# Patient Record
Sex: FEMALE | Race: BLACK OR AFRICAN AMERICAN | Employment: FULL TIME | URBAN - METROPOLITAN AREA
[De-identification: names, ages, dates, MRNs, and addresses within clinical notes are randomized per-mention and may not be internally consistent; named-entity substitution may affect disease eponyms.]

---

## 2017-07-15 ENCOUNTER — HOSPITAL ENCOUNTER (EMERGENCY)
Age: 52
Discharge: HOME | End: 2017-07-15
Attending: EMERGENCY MEDICINE
Payer: SELF-PAY

## 2017-07-15 ENCOUNTER — APPOINTMENT (OUTPATIENT)
Dept: GENERAL RADIOLOGY | Age: 52
End: 2017-07-15
Attending: EMERGENCY MEDICINE
Payer: SELF-PAY

## 2017-07-15 VITALS
OXYGEN SATURATION: 100 % | HEART RATE: 84 BPM | SYSTOLIC BLOOD PRESSURE: 121 MMHG | RESPIRATION RATE: 18 BRPM | WEIGHT: 260 LBS | HEIGHT: 60 IN | TEMPERATURE: 98.6 F | DIASTOLIC BLOOD PRESSURE: 81 MMHG | BODY MASS INDEX: 51.04 KG/M2

## 2017-07-15 DIAGNOSIS — S60.041A CONTUSION OF RIGHT RING FINGER WITHOUT DAMAGE TO NAIL, INITIAL ENCOUNTER: Primary | ICD-10-CM

## 2017-07-15 PROCEDURE — 73140 X-RAY EXAM OF FINGER(S): CPT

## 2017-07-15 PROCEDURE — 77030008323 HC SPLNT FNGR GTR DJOR -A

## 2017-07-15 PROCEDURE — 99282 EMERGENCY DEPT VISIT SF MDM: CPT

## 2017-07-15 RX ORDER — IBUPROFEN 800 MG/1
800 TABLET ORAL
Qty: 20 TAB | Refills: 0 | Status: SHIPPED | OUTPATIENT
Start: 2017-07-15 | End: 2017-07-22

## 2017-07-15 NOTE — ED TRIAGE NOTES
Patient states jamming her right 4th finger while attempting to get into her car quickly to avoid the rain. Bruising noted to distal aspect of affected finger.

## 2017-07-15 NOTE — ED NOTES
Pt states ready for discharge. Pt states she will follow up with PCP as instructed by provider. Pt appears in NOAD. I have reviewed discharge instructions with the patient. Prescriptions were reviewed with patient instructed not to drink alcohol, drive a car, or operate heavy machinery while taking this medicine. The patient verbalized understanding. Patient seen leaving ED ambulatory without difficulty or need for assistance, with S/O in no sign of distress. Patient armband removed and shredded    Current Discharge Medication List      START taking these medications    Details   ibuprofen (MOTRIN) 800 mg tablet Take 1 Tab by mouth every six (6) hours as needed for Pain for up to 7 days. Qty: 20 Tab, Refills: 0         CONTINUE these medications which have NOT CHANGED    Details   amLODIPine (NORVASC) 5 mg tablet Take 5 mg by mouth daily. losartan (COZAAR) 50 mg tablet Take  by mouth daily. venlafaxine-SR (EFFEXOR XR) 75 mg capsule Take  by mouth daily. aspirin 81 mg chewable tablet Take 81 mg by mouth daily.

## 2017-07-15 NOTE — ED PROVIDER NOTES
HPI Comments: Rogerio Otto is a 54 y.o. female to ED c/o right ring finger pain, mainly over distal and proximal phalanges, after she jammed her finger in a car door yesterday. Pt has bruising but no wounds. No other injuries    Patient is a 54 y.o. female presenting with finger swelling. The history is provided by the patient. Finger Swelling    Pertinent negatives include no numbness. Past Medical History:   Diagnosis Date    Depression     Hypertension        Past Surgical History:   Procedure Laterality Date    HX  SECTION           Family History:   Problem Relation Age of Onset    Hypertension Other     Cancer Neg Hx     Diabetes Neg Hx     Heart Disease Neg Hx     Stroke Neg Hx        Social History     Social History    Marital status:      Spouse name: N/A    Number of children: N/A    Years of education: N/A     Occupational History    Not on file. Social History Main Topics    Smoking status: Current Every Day Smoker     Packs/day: 0.25    Smokeless tobacco: Not on file    Alcohol use 1.0 oz/week     2 Cans of beer per week    Drug use: No    Sexual activity: Not on file     Other Topics Concern    Not on file     Social History Narrative         ALLERGIES: Review of patient's allergies indicates no known allergies. Review of Systems   Constitutional: Negative. Musculoskeletal: Positive for arthralgias and joint swelling. Right ring finger per HPI   Skin:        +bruise 4th right finger   Neurological: Negative for weakness and numbness. All other systems reviewed and are negative. Vitals:    07/15/17 1457   BP: 121/81   Pulse: 84   Resp: 18   Temp: 98.6 °F (37 °C)   SpO2: 100%   Weight: 117.9 kg (260 lb)   Height: 5' (1.524 m)            Physical Exam   Constitutional: She appears well-developed and well-nourished. No distress. Neck: Normal range of motion. Cardiovascular: Intact distal pulses.     Pulmonary/Chest: Effort normal. Musculoskeletal: She exhibits tenderness. She exhibits no edema or deformity. Rt 4th finger with bruising and TTP along middle and distal phalanges, MCP, PIP and DIP with FROM wo crepitus but DIP with pain, nail intact, no wounds, no deformity, no asymmetry on comparison   Neurological: She is alert. She has normal reflexes. Nursing note and vitals reviewed. MDM  Number of Diagnoses or Management Options  Contusion of right ring finger without damage to nail, initial encounter:   Diagnosis management comments: No evidence of injury beyond contusion  Will xplint for shock protection, no furhter cantor needed, rx Motrin for pain, no f/u needed      Splint Check Note    Patient: Rogerio Otto  MRN: [id number]  Date: 7/15/2017  Age:  54 y.o.,      Sex: female    YOB: 1965      Type of Splint: Alumafoam   Location: right ring finger    Applied by Praful Ford RN neurovascular intact prior to splint placement neurovascular intact after splint placement splint is placed in good position. FADUMO Velásquez July 15, 2017 3:26 PM        Amount and/or Complexity of Data Reviewed  Tests in the radiology section of CPT®: ordered and reviewed (Neg right 4th finger xray. radiology reading pending. FADUMO Arellano 3:26 PM )    Risk of Complications, Morbidity, and/or Mortality  Presenting problems: low  Diagnostic procedures: low  Management options: low    Patient Progress  Patient progress: improved    ED Course       Procedures    Diagnosis:   1.  Contusion of right ring finger without damage to nail, initial encounter          Disposition: home    Follow-up Information     Follow up With Details Comments [address] MD  As needed [address] [facility] [address] EMERGENCY DEPT  As needed, If symptoms worsen [address] [facility]  [phone]          Patient's Medications   Start Taking    IBUPROFEN (MOTRIN) 800 MG TABLET    Take 1 Tab by mouth every six (6) hours as needed for Pain for up to 7 days. Continue Taking    AMLODIPINE (NORVASC) 5 MG TABLET    Take 5 mg by mouth daily. ASPIRIN 81 MG CHEWABLE TABLET    Take 81 mg by mouth daily. LOSARTAN (COZAAR) 50 MG TABLET    Take  by mouth daily. VENLAFAXINE-SR (EFFEXOR XR) 75 MG CAPSULE    Take  by mouth daily. These Medications have changed    No medications on file   Stop Taking    OXYCODONE-ACETAMINOPHEN (PERCOCET) 5-325 MG PER TABLET    Take 1 tablet every 4-6 hours as needed for pain control. If you were instructed to try over the counter ibuprofen or tylenol, only take the percocet for pain not controlled with the over the counter medication.

## 2018-04-13 ENCOUNTER — HOSPITAL ENCOUNTER (EMERGENCY)
Age: 53
Discharge: HOME | End: 2018-04-13
Attending: EMERGENCY MEDICINE
Payer: SELF-PAY

## 2018-04-13 ENCOUNTER — APPOINTMENT (OUTPATIENT)
Dept: GENERAL RADIOLOGY | Age: 53
End: 2018-04-13
Attending: EMERGENCY MEDICINE
Payer: SELF-PAY

## 2018-04-13 VITALS
OXYGEN SATURATION: 97 % | RESPIRATION RATE: 22 BRPM | HEIGHT: 60 IN | SYSTOLIC BLOOD PRESSURE: 123 MMHG | DIASTOLIC BLOOD PRESSURE: 81 MMHG | TEMPERATURE: 98.8 F | HEART RATE: 98 BPM | BODY MASS INDEX: 51.44 KG/M2 | WEIGHT: 262 LBS

## 2018-04-13 DIAGNOSIS — J20.9 ACUTE BRONCHITIS, UNSPECIFIED ORGANISM: Primary | ICD-10-CM

## 2018-04-13 PROCEDURE — 74011000250 HC RX REV CODE- 250: Performed by: EMERGENCY MEDICINE

## 2018-04-13 PROCEDURE — 99282 EMERGENCY DEPT VISIT SF MDM: CPT

## 2018-04-13 PROCEDURE — 71046 X-RAY EXAM CHEST 2 VIEWS: CPT

## 2018-04-13 PROCEDURE — 94640 AIRWAY INHALATION TREATMENT: CPT

## 2018-04-13 PROCEDURE — 77030029684 HC NEB SM VOL KT MONA -A

## 2018-04-13 RX ORDER — AZITHROMYCIN 250 MG/1
TABLET, FILM COATED ORAL
Qty: 6 TAB | Refills: 0 | Status: SHIPPED | OUTPATIENT
Start: 2018-04-13 | End: 2018-04-18

## 2018-04-13 RX ORDER — PREDNISONE 50 MG/1
50 TABLET ORAL DAILY
Qty: 5 TAB | Refills: 0 | Status: SHIPPED | OUTPATIENT
Start: 2018-04-13 | End: 2018-04-18

## 2018-04-13 RX ORDER — IPRATROPIUM BROMIDE AND ALBUTEROL SULFATE 2.5; .5 MG/3ML; MG/3ML
3 SOLUTION RESPIRATORY (INHALATION) ONCE
Status: COMPLETED | OUTPATIENT
Start: 2018-04-13 | End: 2018-04-13

## 2018-04-13 RX ORDER — IPRATROPIUM BROMIDE AND ALBUTEROL SULFATE 2.5; .5 MG/3ML; MG/3ML
3 SOLUTION RESPIRATORY (INHALATION)
Qty: 30 NEBULE | Refills: 0 | Status: SHIPPED | OUTPATIENT
Start: 2018-04-13 | End: 2018-04-13

## 2018-04-13 RX ADMIN — IPRATROPIUM BROMIDE AND ALBUTEROL SULFATE 3 ML: .5; 3 SOLUTION RESPIRATORY (INHALATION) at 16:39

## 2018-04-13 NOTE — DISCHARGE INSTRUCTIONS
Bronchitis: Care Instructions  Your Care Instructions    Bronchitis is inflammation of the bronchial tubes, which carry air to the lungs. The tubes swell and produce mucus, or phlegm. The mucus and inflamed bronchial tubes make you cough. You may have trouble breathing. Most cases of bronchitis are caused by viruses like those that cause colds. Antibiotics usually do not help and they may be harmful. Bronchitis usually develops rapidly and lasts about 2 to 3 weeks in otherwise healthy people. Follow-up care is a key part of your treatment and safety. Be sure to make and go to all appointments, and call your doctor if you are having problems. It's also a good idea to know your test results and keep a list of the medicines you take. How can you care for yourself at home? · Take all medicines exactly as prescribed. Call your doctor if you think you are having a problem with your medicine. · Get some extra rest.  · Take an over-the-counter pain medicine, such as acetaminophen (Tylenol), ibuprofen (Advil, Motrin), or naproxen (Aleve) to reduce fever and relieve body aches. Read and follow all instructions on the label. · Do not take two or more pain medicines at the same time unless the doctor told you to. Many pain medicines have acetaminophen, which is Tylenol. Too much acetaminophen (Tylenol) can be harmful. · Take an over-the-counter cough medicine that contains dextromethorphan to help quiet a dry, hacking cough so that you can sleep. Avoid cough medicines that have more than one active ingredient. Read and follow all instructions on the label. · Breathe moist air from a humidifier, hot shower, or sink filled with hot water. The heat and moisture will thin mucus so you can cough it out. · Do not smoke. Smoking can make bronchitis worse. If you need help quitting, talk to your doctor about stop-smoking programs and medicines. These can increase your chances of quitting for good.   When should you call for help? Call 911 anytime you think you may need emergency care. For example, call if:  ? · You have severe trouble breathing. ?Call your doctor now or seek immediate medical care if:  ? · You have new or worse trouble breathing. ? · You cough up dark brown or bloody mucus (sputum). ? · You have a new or higher fever. ? · You have a new rash. ? Watch closely for changes in your health, and be sure to contact your doctor if:  ? · You cough more deeply or more often, especially if you notice more mucus or a change in the color of your mucus. ? · You are not getting better as expected. Where can you learn more? Go to http://adam-hira.info/. Enter H333 in the search box to learn more about \"Bronchitis: Care Instructions. \"  Current as of: May 12, 2017  Content Version: 11.4  © 6813-3749 [employer]. Care instructions adapted under license by [employer] (which disclaims liability or warranty for this information). If you have questions about a medical condition or this instruction, always ask your healthcare professional. [address] any warranty or liability for your use of this information.

## 2018-04-13 NOTE — ED PROVIDER NOTES
EMERGENCY DEPARTMENT HISTORY AND PHYSICAL EXAM    3:57 PM      Date: 2018  Patient Name: Melania Gallegos    History of Presenting Illness     Chief Complaint   Patient presents with    Chest Congestion         History Provided By: Patient    Chief Complaint: Congestion; Cough   Duration:  Days  Timing:  Constant  Location: N/A  Quality: N/A  Severity: N/A  Modifying Factors: No relief with Theraflu or Mucinex   Associated Symptoms: denies any other associated signs or symptoms      Additional History (Context): Melania Gallegos is a 50 y.o. female with PMHx of HTN presenting to the ED c/o constant cough and congestion for the past 4 days. States cough is productive of yellow/green sputum. Reports taking Theraflu and Mucinex with no relief. States she recently quit smoking. Denies sick contacts, fever, nausea, vomiting, diarrhea, and any other symptoms or complaints. PCP: Neli Hensley MD    Current Facility-Administered Medications   Medication Dose Route Frequency Provider Last Rate Last Dose    albuterol-ipratropium (DUO-NEB) 2.5 MG-0.5 MG/3 ML  3 mL Nebulization ONCE Neomi Carrel Moncion, DO         Current Outpatient Prescriptions   Medication Sig Dispense Refill    azithromycin (ZITHROMAX Z-TORIN) 250 mg tablet Take two tablets on day one then one tablet daily for the next 4 days. 6 Tab 0    predniSONE (DELTASONE) 50 mg tablet Take 1 Tab by mouth daily for 5 days. 5 Tab 0    albuterol-ipratropium (DUO-NEB) 2.5 mg-0.5 mg/3 ml nebu 3 mL by Nebulization route now for 1 dose. 30 Nebule 0    losartan (COZAAR) 50 mg tablet Take  by mouth daily.  aspirin 81 mg chewable tablet Take 81 mg by mouth daily.          Past History     Past Medical History:  Past Medical History:   Diagnosis Date    Depression     Hypertension        Past Surgical History:  Past Surgical History:   Procedure Laterality Date    HX  SECTION         Family History:  Family History   Problem Relation Age of Onset    Hypertension Other     Cancer Neg Hx     Diabetes Neg Hx     Heart Disease Neg Hx     Stroke Neg Hx        Social History:  Social History   Substance Use Topics    Smoking status: Former Smoker     Packs/day: 0.25    Smokeless tobacco: Never Used    Alcohol use 1.0 oz/week     2 Cans of beer per week       Allergies: Allergies   Allergen Reactions    Tramadol Nausea and Vomiting         Review of Systems       Review of Systems   Constitutional: Negative. Negative for chills, diaphoresis and fever. HENT: Positive for congestion. Negative for rhinorrhea and sore throat. Eyes: Negative. Negative for pain, discharge and redness. Respiratory: Positive for cough. Negative for chest tightness, shortness of breath and wheezing. Cardiovascular: Negative. Negative for chest pain. Gastrointestinal: Negative. Negative for abdominal pain, constipation, diarrhea, nausea and vomiting. Genitourinary: Negative. Negative for dysuria, flank pain, frequency, hematuria and urgency. Musculoskeletal: Negative. Negative for back pain and neck pain. Skin: Negative. Negative for rash. Neurological: Negative. Negative for syncope, weakness, numbness and headaches. Psychiatric/Behavioral: Negative. All other systems reviewed and are negative. Physical Exam     Visit Vitals    /81 (BP 1 Location: Left arm)    Pulse 98    Temp 98.8 °F (37.1 °C)    Resp 22    Ht 5' (1.524 m)    Wt 118.8 kg (262 lb)    SpO2 97%    BMI 51.17 kg/m2         Physical Exam   Constitutional: She appears well-developed and well-nourished. Non-toxic appearance. She does not have a sickly appearance. She does not appear ill. No distress. HENT:   Head: Normocephalic and atraumatic. Mouth/Throat: Oropharynx is clear and moist. No oropharyngeal exudate. Eyes: Conjunctivae and EOM are normal. Pupils are equal, round, and reactive to light. No scleral icterus. Neck: Normal range of motion. Neck supple.  No hepatojugular reflux and no JVD present. No tracheal deviation present. No thyromegaly present. Cardiovascular: Normal rate, regular rhythm, S1 normal, S2 normal, normal heart sounds, intact distal pulses and normal pulses. Exam reveals no gallop, no S3 and no S4. No murmur heard. Pulses:       Radial pulses are 2+ on the right side, and 2+ on the left side. Dorsalis pedis pulses are 2+ on the right side, and 2+ on the left side. Pulmonary/Chest: Effort normal. No accessory muscle usage. Tachypnea noted. No respiratory distress. She has no decreased breath sounds. She has no wheezes. She has rhonchi in the right lower field. She has no rales. Abdominal: Soft. Normal appearance and bowel sounds are normal. She exhibits no distension and no mass. There is no hepatosplenomegaly. There is no tenderness. There is no rigidity, no rebound, no guarding, no CVA tenderness, no tenderness at McBurney's point and negative Reed's sign. Musculoskeletal: Normal range of motion. Strength 5/5 throughout   Lymphadenopathy:        Head (right side): No submental, no submandibular, no preauricular and no occipital adenopathy present. Head (left side): No submental, no submandibular, no preauricular and no occipital adenopathy present. She has no cervical adenopathy. Right: No supraclavicular adenopathy present. Left: No supraclavicular adenopathy present. Neurological: She is alert. She has normal strength and normal reflexes. She is not disoriented. No cranial nerve deficit or sensory deficit. Coordination and gait normal. GCS eye subscore is 4. GCS verbal subscore is 5. GCS motor subscore is 6. Grossly intact    Skin: Skin is warm, dry and intact. No rash noted. She is not diaphoretic. Psychiatric: She has a normal mood and affect.  Her speech is normal and behavior is normal. Judgment and thought content normal. Cognition and memory are normal.   Nursing note and vitals reviewed. Diagnostic Study Results     Labs -  No results found for this or any previous visit (from the past 12 hour(s)). Radiologic Studies -   XR CHEST PA LAT   Final Result   IMPRESSION:   No radiographic evidence for acute cardiopulmonary process. .         Medical Decision Making   I am the first provider for this patient. I reviewed the vital signs, available nursing notes, past medical history, past surgical history, family history and social history. Vital Signs-Reviewed the patient's vital signs. Records Reviewed: Nursing Notes and Old Medical Records (Time of Review: 3:57 PM)    ED Course: Progress Notes, Reevaluation, and Consults:  Chest X-Ray showed No acute process. 4:19 PM 4/13/2018    Provider Notes (Medical Decision Making):  MDM  Number of Diagnoses or Management Options  Acute bronchitis, unspecified organism:   Diagnosis management comments: Congestion   Bronchitis   Pneumonia       Diagnosis       I have reassessed the patient. Patient is feeling better. Patient will be prescribed Z-stephane, Prednisone, and Proair. Patient was discharged in stable condition. Patient is to return to emergency department if any new or worsening condition. 4:22 PM     Clinical Impression:   1.  Acute bronchitis, unspecified organism        Disposition: Discharged     Follow-up Information     Follow up With Details Comments Contact Info    Micki Clifton MD Call in 3 days For follow up  [address] EMERGENCY DEPT  As needed, If symptoms worsen [address]  [phone]           _______________________________    Attestations:  Scribe Attestation     Kalpana Middleton acting as a scribe for and in the presence of Tia Seek, DO      April 13, 2018 at 4:22 PM       Provider Attestation:      I personally performed the services described in the documentation, reviewed the documentation, as recorded by the scribe in my presence, and it accurately and completely records my words and actions.  April 13, 2018 at 4:22 PM - Melford Kocher Moncion, DO    _______________________________

## 2018-04-13 NOTE — ED NOTES
Mckinley Rivera is a 50 y.o. female that was discharged in stable condition. The patients diagnosis, condition and treatment were explained to  patient and aftercare instructions were given. The patient verbalized understanding. Patient armband removed and shredded.

## 2018-06-20 ENCOUNTER — HOSPITAL ENCOUNTER (EMERGENCY)
Age: 53
Discharge: HOME | End: 2018-06-20
Attending: EMERGENCY MEDICINE
Payer: SELF-PAY

## 2018-06-20 VITALS
OXYGEN SATURATION: 98 % | WEIGHT: 263 LBS | RESPIRATION RATE: 18 BRPM | HEIGHT: 60 IN | SYSTOLIC BLOOD PRESSURE: 138 MMHG | TEMPERATURE: 99.1 F | DIASTOLIC BLOOD PRESSURE: 75 MMHG | BODY MASS INDEX: 51.63 KG/M2 | HEART RATE: 112 BPM
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
## 2018-06-20 PROCEDURE — 74011250637 HC RX REV CODE- 250/637: Performed by: PHYSICIAN ASSISTANT

## 2018-06-20 PROCEDURE — 99282 EMERGENCY DEPT VISIT SF MDM: CPT

## 2018-06-20 RX ORDER — OLMESARTAN MEDOXOMIL AND HYDROCHLOROTHIAZIDE 40/12.5 40; 12.5 MG/1; MG/1
1 TABLET ORAL DAILY
COMMUNITY
End: 2018-11-02

## 2018-06-20 RX ORDER — DEXAMETHASONE SODIUM PHOSPHATE 4 MG/ML
10 INJECTION, SOLUTION INTRA-ARTICULAR; INTRALESIONAL; INTRAMUSCULAR; INTRAVENOUS; SOFT TISSUE
Status: COMPLETED | OUTPATIENT
Start: 2018-06-20 | End: 2018-06-20

## 2018-06-20 RX ADMIN — DEXAMETHASONE SODIUM PHOSPHATE 10 MG: 4 INJECTION, SOLUTION INTRAMUSCULAR; INTRAVENOUS at 22:28

## 2018-06-21 NOTE — ED TRIAGE NOTES
Patient states noting rash to bilateral arms and left chest wall. She states rash is itchy. Small circular reddened areas noted to affected skin. Patient states pain to lower back that she attributes to walking in [facility] today.

## 2018-06-21 NOTE — ED PROVIDER NOTES
EMERGENCY DEPARTMENT HISTORY AND PHYSICAL EXAM    10:18 PM      Date: 6/20/2018  Patient Name: Kimi Weiner    History of Presenting Illness     Chief Complaint   Patient presents with    Rash    Back Pain         History Provided By: Patient    Chief Complaint: rash to bilateral arms and chest  Duration:  Days  Timing:  Acute  Location:   Quality: itching  Severity: Moderate  Modifying Factors: none  Associated Symptoms: denies any other associated signs or symptoms      Additional History (Context):April Oakes is a 50 y.o. female with a pertinent history of HTN, depression who presents to the emergency department for evaluation of itching bumps to bilateral arms and left side of chest x 2 days. States she woke up with them. She denies any recent travel, but states she did accept a mattress cover from a hotel that closed down recently. Pt noticed that bumps became more red and itchy when she was walking around at [facility] today. She is not on any blood thinners, but does take a baby ASA daily. Pt denies any fevers or chills, headache, dizziness or light headedness, ENT issues, CP or discomfort, SOB, cough, n/v/d/c, abd pain, dysuria, hematuria, frequency, focal weakness/numbness/tingling, or rash. Patient has no other complaints at this time. PCP:  Abby Schwarz MD      Current Facility-Administered Medications   Medication Dose Route Frequency Provider Last Rate Last Dose    dexamethasone (DECADRON) 4 mg/mL injection 10 mg  10 mg Oral NOW Shila Bhatti PA-C         Current Outpatient Prescriptions   Medication Sig Dispense Refill    olmesartan-hydroCHLOROthiazide (BENICAR HCT) 40-12.5 mg per tablet Take 1 Tab by mouth daily.  aspirin 81 mg chewable tablet Take 81 mg by mouth daily.          Past History     Past Medical History:  Past Medical History:   Diagnosis Date    Depression     Hypertension        Past Surgical History:  Past Surgical History:   Procedure Laterality Date  HX  SECTION         Family History:  Family History   Problem Relation Age of Onset    Hypertension Other     Cancer Neg Hx     Diabetes Neg Hx     Heart Disease Neg Hx     Stroke Neg Hx        Social History:  Social History   Substance Use Topics    Smoking status: Former Smoker     Packs/day: 0.25    Smokeless tobacco: Never Used    Alcohol use 1.0 oz/week     2 Cans of beer per week       Allergies: Allergies   Allergen Reactions    Tramadol Nausea and Vomiting       Review of Systems       Review of Systems   Constitutional: Negative for chills and fever. HENT: Negative for congestion, rhinorrhea and sore throat. Respiratory: Negative for cough and shortness of breath. Cardiovascular: Negative for chest pain. Gastrointestinal: Negative for abdominal pain, blood in stool, constipation, diarrhea, nausea and vomiting. Genitourinary: Negative for dysuria, frequency and hematuria. Musculoskeletal: Negative for back pain and myalgias. Skin: Negative for rash and wound. Neurological: Negative for dizziness and headaches. Physical Exam     Visit Vitals    /75 (BP 1 Location: Left arm, BP Patient Position: At rest)    Pulse (!) 112    Temp 99.1 °F (37.3 °C)    Resp 18    Ht 5' (1.524 m)    Wt 119.3 kg (263 lb)    SpO2 98%    BMI 51.36 kg/m2       Physical Exam   Constitutional: She is oriented to person, place, and time. She appears well-developed and well-nourished. No distress. HENT:   Head: Normocephalic and atraumatic. Eyes: Conjunctivae are normal.   Neck: Normal range of motion. Neck supple. Cardiovascular: Normal rate, regular rhythm and normal heart sounds. Pulmonary/Chest: Effort normal and breath sounds normal. No respiratory distress. She exhibits no tenderness. Musculoskeletal: She exhibits no edema or deformity. Neurological: She is alert and oriented to person, place, and time. She has normal reflexes. Skin: Skin is warm and dry. Rash noted. She is not diaphoretic. Numerous urticarial, erythematous, indurated lesions in clusters noted to bilateral arms and left lateral chest wall. Psychiatric: She has a normal mood and affect. Nursing note and vitals reviewed. Diagnostic Study Results     Labs -  No results found for this or any previous visit (from the past 12 hour(s)). Radiologic Studies -   No results found. Medical Decision Making   I am the first provider for this patient. I reviewed the vital signs, available nursing notes, past medical history, past surgical history, family history and social history. Vital Signs-Reviewed the patient's vital signs. Pulse Oximetry Analysis -  98% on room air (Interpretation)    Records Reviewed: Nursing Notes (Time of Review: 10:18 PM)    ED Course: Progress Notes, Reevaluation, and Consults:    Provider Notes (Medical Decision Making):   Differential Diagnosis:  Tinea, eczema, psoriasis, dry skin, scabies, allergic reaction, varicella, HSV, acne, bug bites, impetigo, folliculitis, pityriasis, poison ivy/oak/sumac, syphilis, lichen planus, erythema multiforme, Staphylococcal scalded skin syndrome, SJS/TEN, lupus, RMSF, erythema migrans, lichen planus, bullous pemphigoid, dermatitis herpetiformis, bacterial meningitis, chiggers    Plan:  Pt presents in NAD, vitals wnl. Exam and HPI c/w bed bug bites. Will give one dose of steroids here for inflammation. ADvised to inspect her bedding. At this time, patient is stable and appropriate for discharge home. Patient demonstrates understanding of current diagnoses and is in agreement with the treatment plan. They are advised that while the likelihood of serious underlying condition is low at this point given the evaluation performed today, we cannot fully rule it out. They are advised to immediately return with any new symptoms or worsening of current condition. All questions have been answered.   Patient is given educational material regarding their diagnoses, including danger symptoms and when to return to the ED. Follow-up with PCP. Diagnosis     Clinical Impression:   1. Bug bite, initial encounter        Disposition: NM Home    Follow-up Information     Follow up With Details Comments Contact Info    Meda Mcburney, MD Call in 2 days For follow-up [address] [place]ie [address] [facility] EMERGENCY DEPT Go to As needed, If symptoms worsen [address] [facility]  [phone]           Patient's Medications   Start Taking    No medications on file   Continue Taking    ASPIRIN 81 MG CHEWABLE TABLET    Take 81 mg by mouth daily. OLMESARTAN-HYDROCHLOROTHIAZIDE (BENICAR HCT) 40-12.5 MG PER TABLET    Take 1 Tab by mouth daily. These Medications have changed    No medications on file   Stop Taking    LOSARTAN (COZAAR) 50 MG TABLET    Take  by mouth daily.      _______________________________

## 2018-11-02 ENCOUNTER — HOSPITAL ENCOUNTER (EMERGENCY)
Age: 53
Discharge: HOME | End: 2018-11-02
Attending: EMERGENCY MEDICINE
Payer: SELF-PAY

## 2018-11-02 ENCOUNTER — APPOINTMENT (OUTPATIENT)
Dept: GENERAL RADIOLOGY | Age: 53
End: 2018-11-02
Attending: EMERGENCY MEDICINE
Payer: SELF-PAY

## 2018-11-02 VITALS
TEMPERATURE: 98.8 F | SYSTOLIC BLOOD PRESSURE: 132 MMHG | BODY MASS INDEX: 52.22 KG/M2 | HEIGHT: 60 IN | HEART RATE: 89 BPM | OXYGEN SATURATION: 96 % | DIASTOLIC BLOOD PRESSURE: 89 MMHG | WEIGHT: 266 LBS

## 2018-11-02 DIAGNOSIS — M79.644 FINGER PAIN, RIGHT: ICD-10-CM

## 2018-11-02 DIAGNOSIS — M79.672 LEFT FOOT PAIN: Primary | ICD-10-CM

## 2018-11-02 DIAGNOSIS — M79.645 FINGER PAIN, LEFT: ICD-10-CM

## 2018-11-02 PROCEDURE — 73630 X-RAY EXAM OF FOOT: CPT

## 2018-11-02 PROCEDURE — 99283 EMERGENCY DEPT VISIT LOW MDM: CPT

## 2018-11-02 RX ORDER — IBUPROFEN 600 MG/1
600 TABLET ORAL
Qty: 20 TAB | Refills: 0 | Status: SHIPPED | OUTPATIENT
Start: 2018-11-02 | End: 2022-04-04

## 2018-11-02 RX ORDER — HYDROCHLOROTHIAZIDE 12.5 MG/1
12.5 TABLET ORAL DAILY
COMMUNITY

## 2018-11-02 RX ORDER — ROSUVASTATIN CALCIUM 10 MG/1
10 TABLET, COATED ORAL
COMMUNITY

## 2018-11-02 NOTE — LETTER
[address] [place] [address] Dr [facility] EMERGENCY DEPT 
[address] [facility] [address] [phone] Work/School Note Date: 11/2/2018 To Whom It May concern: 
 
Sandra Wright was seen and treated today in the emergency room by the following provider(s): 
Attending Provider: Rosalene Alpers, MD 
Physician Assistant: Carmine Byrnes. Sandra Wright may return to work on 11/3/18 Sincerely, Carmine Nava

## 2018-11-02 NOTE — ED PROVIDER NOTES
EMERGENCY DEPARTMENT HISTORY AND PHYSICAL EXAM 
 
Date: 2018 Patient Name: Julia Maza History of Presenting Illness Chief Complaint Patient presents with  Foot Pain  Finger Pain History Provided By: Patient Chief Complaint: left foot pain, right pinky and left thumb pain Duration: 2-3 weeks Timing:  Gradual 
Quality: [place] Severity: Moderate Modifying Factors: pain in fingers are worse with extension, foot pain is worse when bearing weight Associated Symptoms: none Additional History (Context): Julia Maza is a 48 y.o. female with a history of depression and HTN who presents with a 2-3 week history of bilateral finger pain and left foot pain. Has not tried anything for these symptoms, has never had these issues before, has not been seen for these symptoms, denies known cause for pain, denies history of trauma or injury. Denies history of OA or RA or gout. Pt denies any fevers or chills, headache, dizziness or light headedness, ENT issues, CP or discomfort, SOB, cough, n/v/d/c, abd pain, back pain, diaphoresis, melena/hematochezia, dysuria, hematuria, frequency, focal weakness/numbness/tingling, or rash. Patient has no other complaints at this time. PCP: Terrell Elena MD 
 
Current Outpatient Medications Medication Sig Dispense Refill  azilsartan medoxomil (EDARBI) 80 mg tab tablet Take  by mouth daily.  hydroCHLOROthiazide (HYDRODIURIL) 12.5 mg tablet Take 12.5 mg by mouth daily.  rosuvastatin (CRESTOR) 10 mg tablet Take 10 mg by mouth nightly.  ibuprofen (MOTRIN) 600 mg tablet Take 1 Tab by mouth every six (6) hours as needed for Pain. 20 Tab 0  
 aspirin 81 mg chewable tablet Take 81 mg by mouth daily. Past History Past Medical History: 
Past Medical History:  
Diagnosis Date  Depression  Hypertension Past Surgical History: 
Past Surgical History:  
Procedure Laterality Date  HX  SECTION    
 
 
 Family History: 
Family History Problem Relation Age of Onset  Hypertension Other  Cancer Neg Hx  Diabetes Neg Hx   
 Heart Disease Neg Hx  Stroke Neg Hx Social History: 
Social History Tobacco Use  Smoking status: Former Smoker Packs/day: 0.25  Smokeless tobacco: Never Used Substance Use Topics  Alcohol use: Yes Alcohol/week: 1.0 oz Types: 2 Cans of beer per week  Drug use: No  
 
 
Allergies: Allergies Allergen Reactions  Tramadol Nausea and Vomiting Review of Systems Review of Systems Constitutional: Negative for chills and fever. HENT: Negative for congestion, rhinorrhea and sore throat. Respiratory: Negative for cough and shortness of breath. Cardiovascular: Negative for chest pain. Gastrointestinal: Negative for abdominal pain, blood in stool, constipation, diarrhea, nausea and vomiting. Genitourinary: Negative for dysuria, frequency and hematuria. Musculoskeletal: Negative for back pain and myalgias. Bilateral finger pain and left foot pain Skin: Negative for rash and wound. Neurological: Negative for dizziness and headaches. All other systems reviewed and are negative. All Other Systems Negative Physical Exam  
 
Vitals:  
 11/02/18 1440 BP: 132/89 Pulse: 89 Temp: 98.8 °F (37.1 °C) SpO2: 96% Weight: 120.7 kg (266 lb) Height: 5' (1.524 m) Physical Exam  
Constitutional: She is oriented to person, place, and time. She appears well-developed and well-nourished. No distress. HENT:  
Head: Normocephalic and atraumatic. Eyes: Conjunctivae are normal.  
Neck: Normal range of motion. Neck supple. Cardiovascular: Normal rate, regular rhythm and normal heart sounds. Pulmonary/Chest: Effort normal and breath sounds normal. No respiratory distress. She exhibits no tenderness. Abdominal: Soft. Bowel sounds are normal. She exhibits no distension. There is no tenderness. There is no rebound and no guarding. Musculoskeletal: She exhibits no edema or deformity. Left hand: She exhibits decreased range of motion. She exhibits no tenderness, no bony tenderness, normal two-point discrimination, normal capillary refill, no deformity, no laceration and no swelling. Normal sensation noted. Normal strength noted. She exhibits no finger abduction. Hands: 
     Left foot: There is tenderness. There is normal range of motion, no bony tenderness and no swelling. TTP of left heel Neurological: She is alert and oriented to person, place, and time. Skin: Skin is warm and dry. She is not diaphoretic. Psychiatric: She has a normal mood and affect. Her behavior is normal.  
Nursing note and vitals reviewed. Diagnostic Study Results Labs - No results found for this or any previous visit (from the past 12 hour(s)). Radiologic Studies -  
XR FOOT LT MIN 3 V Final Result CT Results  (Last 48 hours) None CXR Results  (Last 48 hours) None Medical Decision Making I am the first provider for this patient. I reviewed the vital signs, available nursing notes, past medical history, past surgical history, family history and social history. Vital Signs-Reviewed the patient's vital signs. Pulse Oximetry Analysis -  100 % RA Records Reviewed: Nursing Notes and Old Medical Records Procedures: None Procedures Provider Notes (Medical Decision Making):  
 
Differential: fracture, dislocation, abrasion, sprain, contusion, laceration, plantar fascitis Plan: have advised ortho follow up for bilateral finger pain, no emergent signs or symptoms, no signs of infection, will get xray of left foot 4:10 PM 
Have shared reassuring workup with patient, discussed concern for plantar fasciitis, will discharge home with pain meds, ace wrap and crutches. Advised close follow up with Ortho. Patient agrees with the plan and management and states all questions have been thoroughly answered and there are no more remaining questions. MED RECONCILIATION: 
No current facility-administered medications for this encounter. Current Outpatient Medications Medication Sig  
 azilsartan medoxomil (EDARBI) 80 mg tab tablet Take  by mouth daily.  hydroCHLOROthiazide (HYDRODIURIL) 12.5 mg tablet Take 12.5 mg by mouth daily.  rosuvastatin (CRESTOR) 10 mg tablet Take 10 mg by mouth nightly.  ibuprofen (MOTRIN) 600 mg tablet Take 1 Tab by mouth every six (6) hours as needed for Pain.  aspirin 81 mg chewable tablet Take 81 mg by mouth daily. Disposition: 
Home DISCHARGE NOTE:  
Pt has been reexamined. Patient has no new complaints, changes, or physical findings. Care plan outlined and precautions discussed. Results of workup were reviewed with the patient. All medications were reviewed with the patient. All of pt's questions and concerns were addressed. Patient was instructed and agrees to follow up with ortho, as well as to return to the ED upon further deterioration. Patient is ready to go home. Follow-up Information Follow up With Specialties Details Why Contact Info [id number] [facility] EMERGENCY DEPT Emergency Medicine  As needed [id number] [facility] Nadine Aguirre [address] [phone] Yin Harris MD Orthopedic Surgery In 2 days  [address] [facility] [address] [facility] 
[phone] Current Discharge Medication List  
  
START taking these medications Details  
ibuprofen (MOTRIN) 600 mg tablet Take 1 Tab by mouth every six (6) hours as needed for Pain. Qty: 20 Tab, Refills: 0 Diagnosis Clinical Impression: 1. Left foot pain 2. Finger pain, right 3. Finger pain, left

## 2018-11-02 NOTE — ED TRIAGE NOTES
Patient c/o bilateral thumbs \"locking up\". She states pain to left heel. Denies injury to thumbs of heel.

## 2018-11-02 NOTE — DISCHARGE INSTRUCTIONS
Foot Pain: Care Instructions  Your Care Instructions  Foot injuries that cause pain and swelling are fairly common. Almost all sports or home repair projects can cause a misstep that ends up as foot pain. Normal wear and tear, especially as you get older, also can cause foot pain. Most minor foot injuries will heal on their own, and home treatment is usually all you need to do. If you have a severe injury, you may need tests and treatment. Follow-up care is a key part of your treatment and safety. Be sure to make and go to all appointments, and call your doctor if you are having problems. It's also a good idea to know your test results and keep a list of the medicines you take. How can you care for yourself at home? · Take pain medicines exactly as directed. ? If the doctor gave you a prescription medicine for pain, take it as prescribed. ? If you are not taking a prescription pain medicine, ask your doctor if you can take an over-the-counter medicine. · Rest and protect your foot. Take a break from any activity that may cause pain. · Put ice or a cold pack on your foot for 10 to 20 minutes at a time. Put a thin cloth between the ice and your skin. · Prop up the sore foot on a pillow when you ice it or anytime you sit or lie down during the next 3 days. Try to keep it above the level of your heart. This will help reduce swelling. · Your doctor may recommend that you wrap your foot with an elastic bandage. Keep your foot wrapped for as long as your doctor advises. · If your doctor recommends crutches, use them as directed. · Wear roomy footwear. · As soon as pain and swelling end, begin gentle exercises of your foot. Your doctor can tell you which exercises will help. When should you call for help? Call 911 anytime you think you may need emergency care.  For example, call if:    · Your foot turns pale, white, blue, or cold.    Call your doctor now or seek immediate medical care if:    · You cannot move or stand on your foot.     · Your foot looks twisted or out of its normal position.     · Your foot is not stable when you step down.     · You have signs of infection, such as:  ? Increased pain, swelling, warmth, or redness. ? Red streaks leading from the sore area. ? Pus draining from a place on your foot. ? A fever.     · Your foot is numb or tingly.    Watch closely for changes in your health, and be sure to contact your doctor if:    · You do not get better as expected.     · You have bruises from an injury that last longer than 2 weeks. Where can you learn more? Go to http://adam-hira.info/. Enter Y100 in the search box to learn more about \"Foot Pain: Care Instructions. \"  Current as of: November 29, 2017  Content Version: 11.8  © 5414-5485 [employer]. Care instructions adapted under license by [employer] (which disclaims liability or warranty for this information). If you have questions about a medical condition or this instruction, always ask your healthcare professional. [place] [address] any warranty or liability for your use of this information. Heel Pain: Care Instructions  Your Care Instructions  You can have heel pain from an injury or from everyday overuse, such as running or walking a lot. Plantar fasciitis is the most common cause of heel pain. In this condition, the bottom of your foot from the front of the heel to the base of the toes is sore and hard to walk on. Your heel can get better with rest, anti-inflammatory pain medicines, and stretching exercises. Follow-up care is a key part of your treatment and safety. Be sure to make and go to all appointments, and call your doctor if you are having problems. It's also a good idea to know your test results and keep a list of the medicines you take. How can you care for yourself at home? · Rest your feet often.  Reduce your activity to a level that lets you avoid pain. If possible, do not run or walk on hard surfaces. · Take anti-inflammatory medicines to reduce heel pain. These include ibuprofen (Advil, Motrin) and naproxen (Aleve). Read and follow all instructions on the label. · Put ice or a cold pack on your heel for 10 to 20 minutes at a time. Try to do this every 1 to 2 hours for the next 3 days (when you are awake). Put a thin cloth between the ice and your skin. · If ice isn't helping after 2 or 3 days, try heat, such as a heating pad set on low. · If your doctor says it is okay, try these calf stretches. Tight calf muscles can cause heel pain or make it worse. ? Stand about 1 foot from a wall. Place the palms of both hands against the wall at chest level and lean forward against the wall. Put the leg you want to stretch about a step behind your other leg. Keep your back heel on the floor and bend your front knee until you feel a stretch in the back leg. Hold this position for 15 to 30 seconds. Repeat the exercise 2 to 4 times a session. Do 3 to 4 sessions a day. ? Sit down on the floor or a mat with your feet stretched in front of you. Roll up a towel lengthwise, and loop it over the ball of your foot. Holding the towel at both ends, gently pull the towel toward you to stretch your foot. Hold this position for 15 to 30 seconds. Repeat the exercise 2 to 4 times a session. Do 3 to 4 sessions a day. · Wear a night splint if your doctor suggests it. A night splint holds your foot with the toes pointed up. This position gives the bottom of your foot a constant, gentle stretch. · Wear shoes with good arch support. Athletic shoes or shoes with a well-cushioned sole are good choices. · Try a heel lift, heel cup or shoe insert (orthotic) to help cushion your heel. You can buy these at many shoe stores. Use them in both shoes, even if only one foot hurts. · Maintain a healthy weight. This puts less strain on your feet. When should you call for help?   Call your doctor now or seek immediate medical care if:    · You have heel pain with fever, redness, or warmth in your heel.     · You have numbness or tingling in your heel.    Watch closely for changes in your health, and be sure to contact your doctor if:    · You cannot put weight on the sore foot.     · Your heel pain lasts more than 2 weeks. Where can you learn more? Go to http://adam-hira.info/. Enter S299 in the search box to learn more about \"Heel Pain: Care Instructions. \"  Current as of: November 20, 2017  Content Version: 11.8  © 0048-9963 [employer]. Care instructions adapted under license by [employer] (which disclaims liability or warranty for this information). If you have questions about a medical condition or this instruction, always ask your healthcare professional. [address] any warranty or liability for your use of this information. Hand Pain: Care Instructions  Your Care Instructions    Common causes of hand pain are overuse and injuries, such as might happen during sports or home repair projects. Everyday wear and tear, especially as you get older, also can cause hand pain. Most minor hand injuries will heal on their own, and home treatment is usually all you need to do. If you have sudden and severe pain, you may need tests and treatment. Follow-up care is a key part of your treatment and safety. Be sure to make and go to all appointments, and call your doctor if you are having problems. It's also a good idea to know your test results and keep a list of the medicines you take. How can you care for yourself at home? · Take pain medicines exactly as directed. ? If the doctor gave you a prescription medicine for pain, take it as prescribed. ? If you are not taking a prescription pain medicine, ask your doctor if you can take an over-the-counter medicine. · Rest and protect your hand.  Take a break from any activity that may cause pain. · Put ice or a cold pack on your hand for 10 to 20 minutes at a time. Put a thin cloth between the ice and your skin. · Prop up the sore hand on a pillow when you ice it or anytime you sit or lie down during the next 3 days. Try to keep it above the level of your heart. This will help reduce swelling. · If your doctor recommends a sling, splint, or elastic bandage to support your hand, wear it as directed. When should you call for help? Call 911 anytime you think you may need emergency care. For example, call if:    · Your hand turns cool or pale or changes color.    Call your doctor now or seek immediate medical care if:    · You cannot move your hand.     · Your hand pops, moves out of its normal position, and then returns to its normal position.     · You have signs of infection, such as:  ? Increased pain, swelling, warmth, or redness. ? Red streaks leading from the sore area. ? Pus draining from a place on your hand. ? A fever.     · Your hand feels numb or tingly.    Watch closely for changes in your health, and be sure to contact your doctor if:    · Your hand feels unstable when you try to use it.     · You do not get better as expected.     · You have any new symptoms, such as swelling.     · Bruises from an injury to your hand last longer than 2 weeks. Where can you learn more? Go to http://adam-hira.info/. Enter R273 in the search box to learn more about \"Hand Pain: Care Instructions. \"  Current as of: November 20, 2017  Content Version: 11.8  © 9666-3583 [employer]. Care instructions adapted under license by [employer] (which disclaims liability or warranty for this information). If you have questions about a medical condition or this instruction, always ask your healthcare professional. [address] any warranty or liability for your use of this information.

## 2019-10-02 NOTE — ED NOTES
I have reviewed discharge instructions with the patient. The patient verbalized understanding. Patient armband removed and shredded Current Discharge Medication List  
  
START taking these medications Details  
ibuprofen (MOTRIN) 600 mg tablet Take 1 Tab by mouth every six (6) hours as needed for Pain. Qty: 20 Tab, Refills: 0
Name band;

## 2020-02-11 ENCOUNTER — HOSPITAL ENCOUNTER (EMERGENCY)
Age: 55
Discharge: HOME | End: 2020-02-11
Attending: EMERGENCY MEDICINE
Payer: MEDICAID

## 2020-02-11 ENCOUNTER — APPOINTMENT (OUTPATIENT)
Dept: GENERAL RADIOLOGY | Age: 55
End: 2020-02-11
Attending: EMERGENCY MEDICINE
Payer: MEDICAID

## 2020-02-11 VITALS
RESPIRATION RATE: 16 BRPM | SYSTOLIC BLOOD PRESSURE: 154 MMHG | DIASTOLIC BLOOD PRESSURE: 89 MMHG | OXYGEN SATURATION: 96 % | TEMPERATURE: 97.9 F | WEIGHT: 267 LBS | HEIGHT: 60 IN | HEART RATE: 92 BPM | BODY MASS INDEX: 52.42 KG/M2

## 2020-02-11 DIAGNOSIS — R06.00 DYSPNEA, UNSPECIFIED TYPE: Primary | ICD-10-CM

## 2020-02-11 PROCEDURE — 71046 X-RAY EXAM CHEST 2 VIEWS: CPT

## 2020-02-11 PROCEDURE — 94640 AIRWAY INHALATION TREATMENT: CPT

## 2020-02-11 PROCEDURE — 74011000250 HC RX REV CODE- 250: Performed by: EMERGENCY MEDICINE

## 2020-02-11 PROCEDURE — 99284 EMERGENCY DEPT VISIT MOD MDM: CPT

## 2020-02-11 PROCEDURE — 77030029684 HC NEB SM VOL KT MONA -A

## 2020-02-11 RX ORDER — ALBUTEROL SULFATE 90 UG/1
1-2 AEROSOL, METERED RESPIRATORY (INHALATION)
Qty: 1 INHALER | Refills: 0 | Status: SHIPPED | OUTPATIENT
Start: 2020-02-11

## 2020-02-11 RX ORDER — IPRATROPIUM BROMIDE AND ALBUTEROL SULFATE 2.5; .5 MG/3ML; MG/3ML
3 SOLUTION RESPIRATORY (INHALATION)
Status: COMPLETED | OUTPATIENT
Start: 2020-02-11 | End: 2020-02-11

## 2020-02-11 RX ADMIN — IPRATROPIUM BROMIDE AND ALBUTEROL SULFATE 3 ML: .5; 3 SOLUTION RESPIRATORY (INHALATION) at 21:22

## 2020-02-12 NOTE — ED NOTES
I have reviewed discharge instructions with the patient. The patient verbalized understanding. Medication teaching given, to include name, dose, action, and side effects. Patient verbalized understanding of medications. Encouraged patient to voice any concerns with reassurance provided. Patient armband removed and shredded    Patient Discharged in stable condition. Lungs CTA bilaterally, Denies any SOB/Difficulty breathing.

## 2020-02-12 NOTE — DISCHARGE INSTRUCTIONS
Return for pain, fever not resolving with motrin or tylenol, any shortness of breath, vomiting, decreased fluid intake, weakness, numbness, dizziness, or any change or concerns. Stop using illicit drugs as we discussed. Patient Education        Shortness of Breath: Care Instructions  Your Care Instructions  Shortness of breath has many causes. Sometimes conditions such as anxiety can lead to shortness of breath. Some people get mild shortness of breath when they exercise. Trouble breathing also can be a symptom of a serious problem, such as asthma, lung disease, emphysema, heart problems, and pneumonia. If your shortness of breath continues, you may need tests and treatment. Watch for any changes in your breathing and other symptoms. Follow-up care is a key part of your treatment and safety. Be sure to make and go to all appointments, and call your doctor if you are having problems. It's also a good idea to know your test results and keep a list of the medicines you take. How can you care for yourself at home? · Do not smoke or allow others to smoke around you. If you need help quitting, talk to your doctor about stop-smoking programs and medicines. These can increase your chances of quitting for good. · Get plenty of rest and sleep. · Take your medicines exactly as prescribed. Call your doctor if you think you are having a problem with your medicine. · Find healthy ways to deal with stress. ? Exercise daily. ? Get plenty of sleep. ? Eat regularly and well. When should you call for help? Call 911 anytime you think you may need emergency care. For example, call if:    · You have severe shortness of breath.     · You have symptoms of a heart attack. These may include:  ? Chest pain or pressure, or a strange feeling in the chest.  ? Sweating. ? Shortness of breath. ? Nausea or vomiting.   ? Pain, pressure, or a strange feeling in the back, neck, jaw, or upper belly or in one or both shoulders or arms. ? Lightheadedness or sudden weakness. ? A fast or irregular heartbeat. After you call 911, the  may tell you to chew 1 adult-strength or 2 to 4 low-dose aspirin. Wait for an ambulance. Do not try to drive yourself.    Call your doctor now or seek immediate medical care if:    · Your shortness of breath gets worse or you start to wheeze. Wheezing is a high-pitched sound when you breathe.     · You wake up at night out of breath or have to prop your head up on several pillows to breathe.     · You are short of breath after only light activity or while at rest.    Watch closely for changes in your health, and be sure to contact your doctor if:    · You do not get better over the next 1 to 2 days. Where can you learn more? Go to http://adam-hira.info/. Enter S780 in the search box to learn more about \"Shortness of Breath: Care Instructions. \"  Current as of: June 9, 2019  Content Version: 12.2  © 0848-5075 [employer]. Care instructions adapted under license by [employer] (which disclaims liability or warranty for this information). If you have questions about a medical condition or this instruction, always ask your healthcare professional. [address] any warranty or liability for your use of this information.

## 2020-08-04 ENCOUNTER — APPOINTMENT (OUTPATIENT)
Dept: GENERAL RADIOLOGY | Age: 55
End: 2020-08-04
Attending: EMERGENCY MEDICINE
Payer: MEDICAID

## 2020-08-04 ENCOUNTER — HOSPITAL ENCOUNTER (EMERGENCY)
Age: 55
Discharge: HOME | End: 2020-08-04
Attending: EMERGENCY MEDICINE
Payer: MEDICAID

## 2020-08-04 VITALS
DIASTOLIC BLOOD PRESSURE: 71 MMHG | HEIGHT: 60 IN | RESPIRATION RATE: 20 BRPM | HEART RATE: 61 BPM | SYSTOLIC BLOOD PRESSURE: 123 MMHG | TEMPERATURE: 98.1 F | OXYGEN SATURATION: 98 % | BODY MASS INDEX: 52.14 KG/M2

## 2020-08-04 DIAGNOSIS — S83.412A SPRAIN OF MEDIAL COLLATERAL LIGAMENT OF LEFT KNEE, INITIAL ENCOUNTER: Primary | ICD-10-CM

## 2020-08-04 PROCEDURE — 74011250637 HC RX REV CODE- 250/637: Performed by: EMERGENCY MEDICINE

## 2020-08-04 PROCEDURE — 99283 EMERGENCY DEPT VISIT LOW MDM: CPT

## 2020-08-04 PROCEDURE — 73562 X-RAY EXAM OF KNEE 3: CPT

## 2020-08-04 RX ORDER — IBUPROFEN 600 MG/1
600 TABLET ORAL
Qty: 20 TAB | Refills: 0 | Status: SHIPPED | OUTPATIENT
Start: 2020-08-04 | End: 2022-04-04

## 2020-08-04 RX ORDER — IBUPROFEN 600 MG/1
600 TABLET ORAL ONCE
Status: COMPLETED | OUTPATIENT
Start: 2020-08-04 | End: 2020-08-04

## 2020-08-04 RX ADMIN — IBUPROFEN 600 MG: 600 TABLET, FILM COATED ORAL at 19:03

## 2020-08-04 NOTE — ED PROVIDER NOTES
EMERGENCY DEPARTMENT HISTORY AND PHYSICAL EXAM    6:05 PM  Date: 2020  Patient Name: Maycol Suarez    History of Presenting Illness     Chief Complaint   Patient presents with    Knee Pain    Knee Injury        History Provided By: Patient    HPI: Maycol Suarez is a 48 y.o. female with history of hypertension depression. Patient is presenting with left knee pain after she tripped and twisted her left knee. She was able to brace herself and did not fall. This happened earlier today and she has been having difficulty ambulating with left knee pain. Denies other injuries. Location:  Severity:  Timing/course: Onset/Duration:     PCP: Other, MD Keshav    Past History     Past Medical History:  Past Medical History:   Diagnosis Date    Depression     Hypertension        Past Surgical History:  Past Surgical History:   Procedure Laterality Date    HX  SECTION         Family History:  Family History   Problem Relation Age of Onset    Hypertension Other     Cancer Neg Hx     Diabetes Neg Hx     Heart Disease Neg Hx     Stroke Neg Hx        Social History:  Social History     Tobacco Use    Smoking status: Former Smoker     Packs/day: 0.25    Smokeless tobacco: Never Used   Substance Use Topics    Alcohol use: Yes     Alcohol/week: 1.7 standard drinks     Types: 2 Cans of beer per week    Drug use: Yes     Types: Marijuana       Allergies: Allergies   Allergen Reactions    Tramadol Nausea and Vomiting       Review of Systems   Review of Systems   Musculoskeletal: Positive for arthralgias. All other systems reviewed and are negative. Physical Exam     Patient Vitals for the past 12 hrs:   Temp Pulse Resp BP SpO2   20 1757     98 %   20 1657 98.1 °F (36.7 °C) 61 20 123/71 98 %       Physical Exam  Vitals signs and nursing note reviewed. Constitutional:       Appearance: Normal appearance. She is obese. HENT:      Head: Normocephalic and atraumatic.    Eyes: Extraocular Movements: Extraocular movements intact. Neck:      Musculoskeletal: Normal range of motion and neck supple. Cardiovascular:      Rate and Rhythm: Normal rate. Pulses: Normal pulses. Pulmonary:      Effort: Pulmonary effort is normal. No respiratory distress. Musculoskeletal: Normal range of motion. General: No swelling or deformity. Left knee: Tenderness found. Medial joint line tenderness noted. Skin:     General: Skin is warm and dry. Neurological:      General: No focal deficit present. Mental Status: She is alert and oriented to person, place, and time. Motor: No weakness. Psychiatric:         Mood and Affect: Mood normal.         Behavior: Behavior normal.         Diagnostic Study Results     Labs -  No results found for this or any previous visit (from the past 12 hour(s)). Radiologic Studies -   No results found. Medical Decision Making     ED Course: Progress Notes, Reevaluation, and Consults:    6:05 PM Initial assessment performed. The patients presenting problems have been discussed, and they/their family are in agreement with the care plan formulated and outlined with them. I have encouraged them to ask questions as they arise throughout their visit. Provider Notes (Medical Decision Making): 61-year-old female presenting with left knee pain after twisting injury. Patient is morbidly obese but I did not appreciate any significant knee swelling or effusion however she did have tenderness over the medial joint line as well as tenderness on medial stress test.  Likely ligamentous sprain or injury. Knee x-ray was normal.  Will place in a knee immobilizer, crutches and weight bearing as tolerated. [facility] follow-up    Procedures:     Critical Care Time:     Vital Signs-Reviewed the patient's vital signs. Reviewed pt's pulse ox reading. EKG: Interpreted by the EP.    Time Interpreted:    Rate:    Rhythm: Interpretation:   Comparison:     Records Reviewed: Nursing Notes (Time of Review: 6:05 PM)  -I am the first provider for this patient.  -I reviewed the vital signs, available nursing notes, past medical history, past surgical history, family history and social history. Current Outpatient Medications   Medication Sig Dispense Refill    albuterol (PROVENTIL HFA, VENTOLIN HFA, PROAIR HFA) 90 mcg/actuation inhaler Take 1-2 Puffs by inhalation every four (4) hours as needed for Wheezing. 1 Inhaler 0    hydroCHLOROthiazide (HYDRODIURIL) 12.5 mg tablet Take 12.5 mg by mouth daily.  rosuvastatin (CRESTOR) 10 mg tablet Take 10 mg by mouth nightly.  ibuprofen (MOTRIN) 600 mg tablet Take 1 Tab by mouth every six (6) hours as needed for Pain. 20 Tab 0    aspirin 81 mg chewable tablet Take 81 mg by mouth daily. Clinical Impression     Clinical Impression: No diagnosis found. Disposition: dc        This note was dictated utilizing voice recognition software which may lead to typographical errors. I apologize in advance if the situation occurs. If questions arise please do not hesitate to contact me or call our department.     Cherry Espinoza MD  6:05 PM

## 2020-08-04 NOTE — DISCHARGE INSTRUCTIONS
Patient Education        Learning About RICE (Rest, Ice, Compression, and Elevation)  What is RICE? RICE is a way to care for an injury. RICE helps relieve pain and swelling. It may also help with healing and flexibility. RICE stands for:  · R est and protect the injured or sore area. · I ce or a cold pack used as soon as possible. · C ompression, or wrapping the injured or sore area with an elastic bandage. · E levation (propping up) the injured or sore area. How do you do RICE? You can use RICE for home treatment when you have general aches and pains or after an injury or surgery. Rest  · Do not put weight on the injury for at least 24 to 48 hours. · Use crutches for a badly sprained knee or ankle. · Support a sprained wrist, elbow, or shoulder with a sling. Ice  · Put ice or a cold pack on the injury right away to reduce pain and swelling. Frozen vegetables will also work as an ice pack. Put a thin cloth between the ice or cold pack and your skin. The cloth protects the injured area from getting too cold. · Use ice for 10 to 15 minutes at a time for the first 48 to 72 hours. Compression  · Use compression for sprains, strains, and surgeries of the arms and legs. · Wrap the injured area with an elastic bandage or compression sleeve to reduce swelling. · Don't wrap it too tightly. If the area below it feels numb, tingles, or feels cool, loosen the wrap. Elevation  · Use elevation for areas of the body that can be propped up, such as arms and legs. · Prop up the injured area on pillows whenever you use ice. Keep it propped up anytime you sit or lie down. · Try to keep the injured area at or above the level of your heart. This will help reduce swelling and bruising. Where can you learn more? Go to http://adam-hira.info/  Enter I463 in the search box to learn more about \"Learning About RICE (Rest, Ice, Compression, and Elevation). \"  Current as of: March 2, 2020               Content Version: 12.5  © 2276-8646 [employer]. Care instructions adapted under license by [employer] (which disclaims liability or warranty for this information). If you have questions about a medical condition or this instruction, always ask your healthcare professional. [address] any warranty or liability for your use of this information. Patient Education        Knee Sprain: Care Instructions  Your Care Instructions     A knee sprain is one or more stretched, partly torn, or completely torn knee ligaments. Ligaments are bands of ropelike tissue that connect bone to bone and make the knee stable. The knee has four main ligaments. Knee sprains often happen because of a twisting or bending injury from sports such as skiing, basketball, soccer, or football. The knee turns one way while the lower or upper leg goes another way. A sprain also can happen when the knee is hit from the side or the front. If a knee ligament is slightly stretched, you will probably need only home treatment. You may need a splint or brace (immobilizer) for a partly torn ligament. A complete tear may need surgery. A minor knee sprain may take up to 6 weeks to heal, while a severe sprain may take months. Follow-up care is a key part of your treatment and safety. Be sure to make and go to all appointments, and call your doctor if you are having problems. It's also a good idea to know your test results and keep a list of the medicines you take. How can you care for yourself at home? · Follow instructions about how much weight you can put on your leg and how to walk with crutches. · Prop up your leg on a pillow when you ice it or anytime you sit or lie down for the next 3 days. Try to keep it above the level of your heart. This will help reduce swelling. · Put ice or a cold pack on your knee for 10 to 20 minutes at a time.  Try to do this every 1 to 2 hours for the next 3 days (when you are awake) or until the swelling goes down. Put a thin cloth between the ice and your skin. Do not get the splint wet. · If you have an elastic bandage, make sure it is snug but not so tight that your leg is numb, tingles, or swells below the bandage. You can loosen the bandage if it is too tight. · Your doctor may recommend a brace (immobilizer) to support your knee while it heals. Wear it as directed. · Ask your doctor if you can take an over-the-counter pain medicine, such as acetaminophen (Tylenol), ibuprofen (Advil, Motrin), or naproxen (Aleve). Be safe with medicines. Read and follow all instructions on the label. When should you call for help? [id number] anytime you think you may need emergency care. For example, call if:  · You have sudden chest pain and shortness of breath, or you cough up blood. Call your doctor now or seek immediate medical care if:  · You have increased or severe pain. · You cannot move your toes or ankle. · Your foot is cool or pale or changes color. · You have tingling, weakness, or numbness in your foot or leg. · Your splint or brace feels too tight. · You are unable to straighten the knee, or the knee \"locks. \"  · You have signs of a blood clot in your leg, such as:  ? Pain in your calf, back of the knee, thigh, or groin. ? Redness and swelling in your leg. Watch closely for changes in your health, and be sure to contact your doctor if:  · Your pain is not getting better or is getting worse. Where can you learn more? Go to http://adam-hira.info/  Enter N406 in the search box to learn more about \"Knee Sprain: Care Instructions. \"  Current as of: March 2, 2020               Content Version: 12.5  © 0024-2318 [employer]. Care instructions adapted under license by [employer] (which disclaims liability or warranty for this information).  If you have questions about a medical condition or this instruction, always ask your healthcare professional. [place] [address] any warranty or liability for your use of this information.

## 2020-08-04 NOTE — ED TRIAGE NOTES
Patient c/o pain to left knee s/p slip and fall yesterday while attempting to escape the rain. Patient states ice has not relieved pain to left knee.

## 2020-08-04 NOTE — ED NOTES
Tracy Liz is a 48 y.o. female that was discharged in good condition. The patients diagnosis, condition and treatment were explained to  patient and aftercare instructions were given. The patient verbalized understanding. Patient armband removed and shredded.

## 2020-08-21 ENCOUNTER — OFFICE VISIT (OUTPATIENT)
Dept: ORTHOPEDIC SURGERY | Age: 55
End: 2020-08-21

## 2020-08-21 VITALS
SYSTOLIC BLOOD PRESSURE: 137 MMHG | RESPIRATION RATE: 15 BRPM | BODY MASS INDEX: 52.14 KG/M2 | HEART RATE: 65 BPM | TEMPERATURE: 97.3 F | OXYGEN SATURATION: 100 % | DIASTOLIC BLOOD PRESSURE: 90 MMHG | WEIGHT: 265.6 LBS | HEIGHT: 60 IN

## 2020-08-21 DIAGNOSIS — M76.52 PATELLAR TENDINITIS OF LEFT KNEE: Primary | ICD-10-CM

## 2020-08-21 RX ORDER — DICLOFENAC SODIUM 10 MG/G
2 GEL TOPICAL 4 TIMES DAILY
Qty: 100 G | Refills: 2 | Status: SHIPPED | OUTPATIENT
Start: 2020-08-21 | End: 2022-04-04

## 2020-08-21 NOTE — PROGRESS NOTES
Patient: Cristopher Gottron                MRN: [id number]       SSN: [id number]  YOB: 1965        AGE: 48 y.o. SEX: female  Body mass index is 51.87 kg/m². PCP: Keshav Worrell MD  20    CHIEF COMPLAINT: Left knee pain    HPI: Cristopher Gottron is a 48 y.o. female patient who comes to the office today with left knee pain. The pain is been present for 2 weeks. 2 weeks ago while stepping off of a porch she felt a twisting motion in her knee and caught herself. Since that time she is been having pain over the anterior aspect of her knee. She was seen in the emergency room. She is here today to discuss treatment. She has not been taking any medications. Past Medical History:   Diagnosis Date    Depression     Hypertension        Family History   Problem Relation Age of Onset    Hypertension Other     Cancer Neg Hx     Diabetes Neg Hx     Heart Disease Neg Hx     Stroke Neg Hx        Current Outpatient Medications   Medication Sig Dispense Refill    diclofenac (VOLTAREN) 1 % gel Apply 2 g to affected area four (4) times daily. 100 g 2    hydroCHLOROthiazide (HYDRODIURIL) 12.5 mg tablet Take 12.5 mg by mouth daily.  rosuvastatin (CRESTOR) 10 mg tablet Take 10 mg by mouth nightly.  aspirin 81 mg chewable tablet Take 81 mg by mouth daily.  ibuprofen (MOTRIN) 600 mg tablet Take 1 Tab by mouth every six (6) hours as needed for Pain. 20 Tab 0    albuterol (PROVENTIL HFA, VENTOLIN HFA, PROAIR HFA) 90 mcg/actuation inhaler Take 1-2 Puffs by inhalation every four (4) hours as needed for Wheezing. 1 Inhaler 0    ibuprofen (MOTRIN) 600 mg tablet Take 1 Tab by mouth every six (6) hours as needed for Pain.  20 Tab 0       Allergies   Allergen Reactions    Tramadol Nausea and Vomiting       Past Surgical History:   Procedure Laterality Date    HX  SECTION         Social History     Socioeconomic History    Marital status:      Spouse name: Not on file  Number of children: Not on file    Years of education: Not on file    Highest education level: Not on file   Occupational History    Not on file   Social Needs    Financial resource strain: Not on file    Food insecurity     Worry: Not on file     Inability: Not on file    Transportation needs     Medical: Not on file     Non-medical: Not on file   Tobacco Use    Smoking status: Former Smoker     Packs/day: 0.25    Smokeless tobacco: Never Used   Substance and Sexual Activity    Alcohol use: Yes     Alcohol/week: 1.7 standard drinks     Types: 2 Cans of beer per week    Drug use: Yes     Types: Marijuana    Sexual activity: Not on file   Lifestyle    Physical activity     Days per week: Not on file     Minutes per session: Not on file    Stress: Not on file   Relationships    Social connections     Talks on phone: Not on file     Gets together: Not on file     Attends [employer] service: Not on file     Active member of club or organization: Not on file     Attends meetings of clubs or organizations: Not on file     Relationship status: Not on file    Intimate partner violence     Fear of current or ex partner: Not on file     Emotionally abused: Not on file     Physically abused: Not on file     Forced sexual activity: Not on file   Other Topics Concern    Not on file   Social History Narrative    Not on file       REVIEW OF SYSTEMS:      CON: negative for recent weight loss/gain, fever, or chills  EYE: negative for double or blurry vision  ENT: negative for hoarseness  RS:   negative for cough, URI, SOB  CV:  negative for chest pain, palpitations  GI:    negative for blood in stool, nausea/vomiting  :  negative for blood in urine  MS: As per HPI  Other systems reviewed and noted below.     PHYSICAL EXAMINATION:  Visit Vitals  /90 (BP 1 Location: Left arm, BP Patient Position: Sitting)   Pulse 65   Temp 97.3 °F (36.3 °C) (Oral)   Resp 15   Ht 5' (1.524 m)   Wt 265 lb 9.6 oz (120.5 kg) SpO2 100%   BMI 51.87 kg/m²     Body mass index is 51.87 kg/m². GENERAL: Alert and oriented x3, in no acute distress, well-developed, well-nourished. HEENT: Normocephalic, atraumatic. RESP: Non labored breathing with equal chest rise on inspiration. CV: Well perfused extremities. No cyanosis or clubbing noted. ABDOMEN: Soft, non-tender, non-distended. Knee Examination      R   L  Effusion   -   -  Warmth   -   -  Erythema   -   -  ROM   Extension  Full   Full   Flexion   Full   Full  Tenderness   Medial Joint  -   -   Lateral Joint  -   -   Posterior knee  -   -  Strength   Quad   5   5   Hamstring  5   5  Crepitus   Tibiofemoral   -   -   Patellofemoral  -   -  Instability   Anterior  -   -   Posterior  -   -   Patellofemoral  -   -  Lachman's   -   -  Anterior Drawer  -   -  Posterior Drawer  -   -  Otis's   Pain   -   -   Locking  -   -  Calf TTP   -   -  Straight Leg Raise  -   -  Tender to palpation over the patellar tendon and patella tendon insertion. IMAGING:  X-rays of the left knee from the ER were reviewed which do not show any fractures or dislocations    ASSESSMENT & PLAN  Diagnosis: Left knee patella tendinitis    [facility] has left knee patellar tendinitis. I recommended a topical anti-inflammatory she would like to avoid oral medications. I also recommended a home exercise program which I printed out for her. I will see her back as needed in 6 weeks if she still has problems.       Electronically signed by: Erwin Mendenhall MD

## 2021-11-20 ENCOUNTER — APPOINTMENT (OUTPATIENT)
Dept: GENERAL RADIOLOGY | Age: 56
End: 2021-11-20
Attending: EMERGENCY MEDICINE
Payer: MEDICAID

## 2021-11-20 ENCOUNTER — HOSPITAL ENCOUNTER (EMERGENCY)
Age: 56
Discharge: HOME | End: 2021-11-20
Attending: EMERGENCY MEDICINE
Payer: MEDICAID

## 2021-11-20 VITALS
RESPIRATION RATE: 19 BRPM | SYSTOLIC BLOOD PRESSURE: 117 MMHG | OXYGEN SATURATION: 100 % | WEIGHT: 264.2 LBS | HEIGHT: 60 IN | BODY MASS INDEX: 51.87 KG/M2 | DIASTOLIC BLOOD PRESSURE: 78 MMHG | TEMPERATURE: 97.8 F | HEART RATE: 66 BPM

## 2021-11-20 DIAGNOSIS — M79.675 TOE PAIN, LEFT: Primary | ICD-10-CM

## 2021-11-20 PROCEDURE — 73630 X-RAY EXAM OF FOOT: CPT

## 2021-11-20 PROCEDURE — 99282 EMERGENCY DEPT VISIT SF MDM: CPT

## 2021-11-20 RX ORDER — NAPROXEN 500 MG/1
500 TABLET ORAL 2 TIMES DAILY WITH MEALS
Qty: 6 TABLET | Refills: 0 | Status: SHIPPED | OUTPATIENT
Start: 2021-11-20 | End: 2021-11-23

## 2021-11-20 NOTE — ED PROVIDER NOTES
EMERGENCY DEPARTMENT HISTORY AND PHYSICAL EXAM    8:56 AM  Date: 2021  Patient Name: Cristobal Ram    History of Presenting Illness       History Provided By:     HPI: Cristobal Ram is a 49 y.o. female with past medical history as below presents with left big toe pain after someone stepped on her toe 2 weeks ago. Patient able to ambulate. Patient states that she has pain when she wiggles her big toe. PCP: Mark Ledezma NP    Past History     Past Medical History:  Past Medical History:   Diagnosis Date    Depression     Hypertension        Past Surgical History:  Past Surgical History:   Procedure Laterality Date    HX  SECTION         Family History:  Family History   Problem Relation Age of Onset    Hypertension Other     Cancer Neg Hx     Diabetes Neg Hx     Heart Disease Neg Hx     Stroke Neg Hx        Social History:  Social History     Tobacco Use    Smoking status: Former Smoker     Packs/day: 0.25    Smokeless tobacco: Never Used   Substance Use Topics    Alcohol use: Yes     Alcohol/week: 1.7 standard drinks     Types: 2 Cans of beer per week    Drug use: Yes     Types: Marijuana       Allergies: Allergies   Allergen Reactions    Tramadol Nausea and Vomiting       Review of Systems   Review of Systems   Constitutional: Negative for activity change, appetite change and chills. HENT: Negative for congestion, ear discharge, ear pain and sore throat. Eyes: Negative for photophobia and pain. Respiratory: Negative for cough and choking. Cardiovascular: Negative for palpitations and leg swelling. Gastrointestinal: Negative for anal bleeding and rectal pain. Endocrine: Negative for polydipsia and polyuria. Genitourinary: Negative for genital sores and urgency. Musculoskeletal: Negative for arthralgias and myalgias. Neurological: Negative for dizziness, seizures and speech difficulty.    Psychiatric/Behavioral: Negative for hallucinations, self-injury and suicidal ideas. Physical Exam     No data found. Physical Exam  Vitals and nursing note reviewed. Constitutional:       Appearance: She is well-developed. HENT:      Head: Normocephalic and atraumatic. Eyes:      General:         Right eye: No discharge. Left eye: No discharge. Cardiovascular:      Rate and Rhythm: Normal rate and regular rhythm. Heart sounds: Normal heart sounds. No murmur heard. Pulmonary:      Effort: Pulmonary effort is normal. No respiratory distress. Breath sounds: Normal breath sounds. No stridor. No wheezing or rales. Chest:      Chest wall: No tenderness. Abdominal:      General: Bowel sounds are normal. There is no distension. Palpations: Abdomen is soft. Tenderness: There is no abdominal tenderness. There is no guarding or rebound. Musculoskeletal:         General: Normal range of motion. Cervical back: Normal range of motion and neck supple. Comments: Left toe tenderness  No tenderness along the metatarsals  Neurovascularly intact   Skin:     General: Skin is warm and dry. Neurological:      Mental Status: She is alert and oriented to person, place, and time. Diagnostic Study Results     Labs -  No results found for this or any previous visit (from the past 12 hour(s)). Radiologic Studies -   XR FOOT LT MIN 3 V    Result Date: 11/20/2021  1. No acute findings. 2.  Moderate degenerative changes at the first metatarsal phalangeal joint. Medical Decision Making     ED Course: Progress Notes, Reevaluation, and Consults:    8:56 AM Initial assessment performed. The patients presenting problems have been discussed, and they/their family are in agreement with the care plan formulated and outlined with them. I have encouraged them to ask questions as they arise throughout their visit.       Provider Notes (Medical Decision Making):   Patient presents with left toe pain  X-ray foot no acute findings  Moderate degenerative changes at the first metatarsophalangeal joint  Patient will be given Ortho shoe and toe metal splint will follow up with orthopedics if symptoms persist              Vital Signs-Reviewed the patient's vital signs. Reviewed pt's pulse ox reading. Records Reviewed: old medical records  -I am the first provider for this patient.  -I reviewed the vital signs, available nursing notes, past medical history, past surgical history, family history and social history. Current Outpatient Medications   Medication Sig Dispense Refill    naproxen (NAPROSYN) 500 mg tablet Take 1 Tablet by mouth two (2) times daily (with meals) for 3 days. 6 Tablet 0    diclofenac (VOLTAREN) 1 % gel Apply 2 g to affected area four (4) times daily. 100 g 2    ibuprofen (MOTRIN) 600 mg tablet Take 1 Tab by mouth every six (6) hours as needed for Pain. 20 Tab 0    albuterol (PROVENTIL HFA, VENTOLIN HFA, PROAIR HFA) 90 mcg/actuation inhaler Take 1-2 Puffs by inhalation every four (4) hours as needed for Wheezing. 1 Inhaler 0    hydroCHLOROthiazide (HYDRODIURIL) 12.5 mg tablet Take 12.5 mg by mouth daily.  rosuvastatin (CRESTOR) 10 mg tablet Take 10 mg by mouth nightly.  ibuprofen (MOTRIN) 600 mg tablet Take 1 Tab by mouth every six (6) hours as needed for Pain. 20 Tab 0    aspirin 81 mg chewable tablet Take 81 mg by mouth daily. Clinical Impression     Clinical Impression:   1. Toe pain, left        Disposition: This note was dictated utilizing voice recognition software which may lead to typographical errors. I apologize in advance if the situation occurs. If questions arise please do not hesitate to contact me or call our department.     Tereso Garcia MD  8:56 AM

## 2021-12-15 NOTE — PROGRESS NOTES
AMBULATORY PROGRESS NOTE      Patient: Yue Decker             MRN: [id number]     SSN: [id number] Body mass index is 51.75 kg/m². YOB: 1965     AGE: 49 y.o. EX: female    PCP: Frank Yen NP       IMPRESSION //  DIAGNOSIS AND TREATMENT PLAN        Yue Decker has a diagnosis of:      DIAGNOSES    1. Contusion of left great toe without damage to nail, initial encounter    2. Primary osteoarthritis of left foot        Orders Placed This Encounter    meloxicam (Mobic) 15 mg tablet     Sig: Take 1 Tablet by mouth daily. Dispense:  30 Tablet     Refill:  0      She has a contusion, resolving contusion, to the left great toe first MTP, proximal phalanx, no evidence of fracture she is concomitant, OA of the left great toe, MTP, which she states was asymptomatic, prior to her friend stepped on her foot, 3 weeks ago. PLAN:    1. Continue supportive shoe: Timberland shoes  2. X-rays upon next visit 3 views left foot upon next visit    RTO 4 weeks    There are no Patient Instructions on file for this visit. Please follow up with your PCP for any health maintenance as recommended         Yue Decker  expresses understanding of the diagnosis, treatment plan, and all of their proposed questions were answered to their satisfaction. Patient education has been provided re the diagnoses. HPI //  37 Stephie Granados IS A 49 y.o. female who is a/an  new patient, presenting to my outpatient office for evaluation of  the following chief complaint(s):     Chief Complaint   Patient presents with    Foot Pain     left foot       She is here for an assessment, of her[de-identified] Left great toe. She was seen in the emergency room, by the ER staff, on 11/28/2021, after having sustained injury to her left great toe, 2 weeks prior to this ER visit date. To her date of injury, is approximate 11/4/2021.   She reports to the emergent, complain of pain, after someone stepped on her left great toe. An examination of the ER staff reveal she has some tenderness left great toe she had x-rays, of her left foot, that showed no acute changes but did show some moderate degenerative changes across the left first metatarsophalangeal joint. She is here for reassessment. She is given a hard soled shoe, at that time and instructed to follow with orthopedics should her pain not improve. She is given Naprosyn the anti-inflammatory agent, by the ER staff. Mechanism of injury, one of her friends, who weighs 302 pounds, actually stepped on her left foot. Visit Vitals  Pulse 71   Temp 97.1 °F (36.2 °C) (Temporal)   Ht 5' (1.524 m)   Wt 265 lb (120.2 kg)   SpO2 99%   BMI 51.75 kg/m²       Appearance: Alert, well appearing and pleasant patient who is in no distress, oriented to person, place/time, and who follows commands. Normal dress/motor activity/thought processes/memory. This patient is accompanied in the examination room by her  self. Patient arrives to office via: without assistive device:      Psychiatric:  Normal Affect/mood. Judgement, behavior, and conduct are appropriate. Speech normal in context and clarity, memory intact grossly, no involuntary movements - tremors. H EENT (2): Head normocephalic & atraumatic. Eye: pupils are round// EOM are intact // Neck: ROM WNL  // Hearings Intact   Respiratory: Breathing non labored     ANKLE/FOOT left    Gait: normal  Tenderness: mild to the great toe proximal phalanx, is now tenderness. She is nontender the great toe first MTP at this current time. She is nontender to the midfoot, the TMT joints, including Lisfranc complex. Cutaneous: Swelling, left great toe first MTP  Joint Motion: Functional range of motion, left ankle for symptom:  Joint / Tendon Stability:  No Ankle or Subtalar instability or joint laxity.                        No peroneal sublux ability or dislocation  Alignment: neutral Hindfoot,    Neuro Motor/Sensory: NL/NL  Vascular: NL foot/ankle pulses,   Lymphatics: No extremity lymphedema, No calf swelling, no tenderness to calf muscles. CHART REVIEW     Alcon Campos has been experiencing pain and discomfort confirmed as outlined in the pain assessment outlined below.  was reviewed by Sushant Fallon MD on 2021. Pain Assessment  2021   Location of Pain Foot   Location Modifiers Left   Severity of Pain 8   Quality of Pain Sharp; Aching;Burning   Quality of Pain Comment -   Duration of Pain -   Frequency of Pain Constant   Date Pain First Started 11/15/2021   Aggravating Factors Walking   Limiting Behavior Yes   Relieving Factors Nothing   Result of Injury Yes   Work-Related Injury -        Alcon Campos  has a past medical history of Depression and Hypertension. Patients is employed at:         Past Medical History:   Diagnosis Date    Depression     Hypertension      Past Surgical History:   Procedure Laterality Date    HX  SECTION       Current Outpatient Medications   Medication Sig    meloxicam (Mobic) 15 mg tablet Take 1 Tablet by mouth daily.  albuterol (PROVENTIL HFA, VENTOLIN HFA, PROAIR HFA) 90 mcg/actuation inhaler Take 1-2 Puffs by inhalation every four (4) hours as needed for Wheezing.  rosuvastatin (CRESTOR) 10 mg tablet Take 10 mg by mouth nightly.  aspirin 81 mg chewable tablet Take 81 mg by mouth daily.  diclofenac (VOLTAREN) 1 % gel Apply 2 g to affected area four (4) times daily. (Patient not taking: Reported on 2021)    ibuprofen (MOTRIN) 600 mg tablet Take 1 Tab by mouth every six (6) hours as needed for Pain. (Patient not taking: Reported on 2021)    hydroCHLOROthiazide (HYDRODIURIL) 12.5 mg tablet Take 12.5 mg by mouth daily.  ibuprofen (MOTRIN) 600 mg tablet Take 1 Tab by mouth every six (6) hours as needed for Pain.  (Patient not taking: Reported on 2021)     No current facility-administered medications for this visit. Allergies   Allergen Reactions    Tramadol Nausea and Vomiting     Social History     Occupational History    Not on file   Tobacco Use    Smoking status: Former Smoker     Packs/day: 0.25    Smokeless tobacco: Never Used   Substance and Sexual Activity    Alcohol use: Yes     Alcohol/week: 1.7 standard drinks     Types: 2 Cans of beer per week    Drug use: Yes     Types: Marijuana    Sexual activity: Not on file     Family History   Problem Relation Age of Onset    Hypertension Other     Cancer Neg Hx     Diabetes Neg Hx     Heart Disease Neg Hx     Stroke Neg Hx         DIAGNOSTIC LAB DATA      No results found for: HBA1C, OBM8IVSA, SHO4KGYD // No results found for: GLU, GLUCPOC     No results found for: DCM7ALNO, BQR6ZMMO      No results found for: VITD3, XQVID2, XQVID3, XQVID, VD3RIA, SBOA40HKSCT     Drug Screen Most Recent Result Date    No resulted procedures found. REVIEW OF SYSTEMS : 12/16/2021  ALL BELOW ARE Negative except : SEE HPI     All other systems reviewed and are negative. 12 point review of systems otherwise negative unless noted in HPI. RADIOGRAPHS// IMAGING//DIAGNOSTIC DATA     Orders Placed This Encounter    meloxicam (Mobic) 15 mg tablet     [facility]: INTERPRETATION BY RADIOLOGIST   XR Results (most recent):  Results from Hospital Encounter encounter on 11/20/21    XR FOOT LT MIN 3 V    Narrative  EXAM: XR FOOT LT MIN 3 V    CLINICAL INDICATION/HISTORY : left toe. Trauma to left foot with pain along the  first metatarsal and great toe    COMPARISON: November 2, 2018    TECHNIQUE: 3 VIEWS    FINDINGS: Moderate degenerative changes at the first metatarsal phalangeal  joint. No evidence of fracture or dislocation. Impression  1. No acute findings. 2.  Moderate degenerative changes at the first metatarsal phalangeal joint.      I have reviewed the radiology transcribed results and I have reviewed the images of the above study. I have spent 30 minutes reviewing the previous notes, reviewing diagnostic studies [Advanced  Imaging, Diagnostic test results (x-rays)] and had a direct face to face with the patient discussing the diagnosis and importance of compliance with the treatment and plan. There is  discussion for the potential for surgery, answering all questions, as well as documenting patient care coordination for this individual on the day of the visit. Disclaimer:     Sections of this note are dictated using utilizing voice recognition software, which may have resulted in some phonetic based errors in grammar and contents. Even though attempts were made to correct all the mistakes, some may have been missed, and remained in the body of the document. If questions arise, please contact our department. An electronic signature was used to authenticate this note. Samia Romero may have a reminder for a \"due or due soon\" health maintenance. I have asked that she contact her primary care provider for follow-up on this health maintenance. There are no Patient Instructions on file for this visit. Please follow up with your PCP for any health maintenance as recommended.                 Tony Venegas MD  12/16/2021  5:58 PM

## 2021-12-16 ENCOUNTER — OFFICE VISIT (OUTPATIENT)
Dept: ORTHOPEDIC SURGERY | Age: 56
End: 2021-12-16
Payer: MEDICAID

## 2021-12-16 VITALS
TEMPERATURE: 97.1 F | HEIGHT: 60 IN | WEIGHT: 265 LBS | BODY MASS INDEX: 52.03 KG/M2 | HEART RATE: 71 BPM | OXYGEN SATURATION: 99 %

## 2021-12-16 DIAGNOSIS — S90.112A CONTUSION OF LEFT GREAT TOE WITHOUT DAMAGE TO NAIL, INITIAL ENCOUNTER: Primary | ICD-10-CM

## 2021-12-16 DIAGNOSIS — M19.072 PRIMARY OSTEOARTHRITIS OF LEFT FOOT: ICD-10-CM

## 2021-12-16 PROCEDURE — 99213 OFFICE O/P EST LOW 20 MIN: CPT | Performed by: ORTHOPAEDIC SURGERY

## 2021-12-16 RX ORDER — MELOXICAM 15 MG/1
15 TABLET ORAL DAILY
Qty: 30 TABLET | Refills: 0 | Status: SHIPPED | OUTPATIENT
Start: 2021-12-16

## 2022-01-12 NOTE — PROGRESS NOTES
AMBULATORY PROGRESS NOTE      Patient: Adolph Williamson             MRN: [id number]     SSN: [id number] Body mass index is 52.93 kg/m². YOB: 1965     AGE: 49 y.o. EX: female    PCP: Chey Marquez NP       IMPRESSION //  DIAGNOSIS AND TREATMENT PLAN        Adolph Williamson has a diagnosis of: So she symptomatic, really for the great toe osteoarthritis pre-existing, and had a contusion on top of that. Her acute component of her contusion is resolved, but she still remains symptomatic over the great toe MTP region for chest on the way. Initial conservative care is recommended for her she understands he may require surgical invention, should she not improve. Preferably consider Delvalle's extension    DIAGNOSES    1. Primary osteoarthritis of left foot    2. Contusion of left great toe without damage to nail, initial encounter        Orders Placed This Encounter    Generic Supply Order     Left Custom Graphite Insert w/ Delvalle's extension            PLAN:    1. DME; Left Custom Graphite Insert w/ Delvalle's Extension      RTO; 5 weeks    There are no Patient Instructions on file for this visit. Please follow up with your PCP for any health maintenance as recommended         Adolph Williamson  expresses understanding of the diagnosis, treatment plan, and all of their proposed questions were answered to their satisfaction. Patient education has been provided re the diagnoses. HPI //  37 Stephie Granados IS A 49 y.o. female who is a/an  established patient, presenting to my outpatient office for evaluation of  the following chief complaint(s):     Chief Complaint   Patient presents with    Toe Pain     left great   DOI: 11/04/2021    At [facility] presented w/ contusion of left Great toe. Continue supportive shoe: Timberland shoes. X-rays upon next visit 3 views left foot upon next visit.     Since [facility] continues to endorse left Great toe pain. She mentions still has left Great toe pain every day. Visit Vitals  Pulse 76   Temp 96.9 °F (36.1 °C) (Temporal)   Ht 5' (1.524 m)   Wt 271 lb (122.9 kg)   SpO2 100%   BMI 52.93 kg/m²       Appearance: Alert, well appearing and pleasant patient who is in no distress, oriented to person, place/time, and who follows commands. Normal dress/motor activity/thought processes/memory. This patient is accompanied in the examination room by her  self. Patient arrives to office via: without assistive device:     Psychiatric:  Normal Affect/mood. Judgement, behavior, and conduct are appropriate. Speech normal in context and clarity, memory intact grossly, no involuntary movements - tremors. H EENT (2): Head normocephalic & atraumatic. Eye: pupils are round// EOM are intact // Neck: ROM WNL  // Hearings Intact   Respiratory: Breathing non labored     ANKLE/FOOT left    Gait: slow  Tenderness: mild over  1st MTP joint  Cutaneous:   WNL. Joint Motion:  Decreased MTP ROM great toe  Joint / Tendon Stability:  No Ankle or Subtalar instability or joint laxity. No peroneal sublux ability or dislocation  Alignment: neutral Hindfoot,    Neuro Motor/Sensory: NL/NL  Vascular: NL foot/ankle pulses,   Lymphatics: No extremity lymphedema, No calf swelling, no tenderness to calf muscles. CHART REVIEW     Abundio Galeas has been experiencing pain and discomfort confirmed as outlined in the pain assessment outlined below.  was reviewed by Roge Fuentes MD on 1/19/2022.      Pain Assessment  1/19/2022   Location of Pain Toe   Location Modifiers Left   Severity of Pain 8   Quality of Pain Aching   Quality of Pain Comment -   Duration of Pain -   Frequency of Pain Constant   Date Pain First Started -   Aggravating Factors Other (Comment)   Aggravating Factors Comment worse without shoe on   Limiting Behavior No   Relieving Factors (No Data)   Relieving Factors Comment wearing shoe   Result of Injury -   Work-Related Injury -        Roland Roa  has a past medical history of Depression and Hypertension. Patients is employed at:         Past Medical History:   Diagnosis Date    Depression     Hypertension      Past Surgical History:   Procedure Laterality Date    HX  SECTION       Current Outpatient Medications   Medication Sig    meloxicam (Mobic) 15 mg tablet Take 1 Tablet by mouth daily.  albuterol (PROVENTIL HFA, VENTOLIN HFA, PROAIR HFA) 90 mcg/actuation inhaler Take 1-2 Puffs by inhalation every four (4) hours as needed for Wheezing.  hydroCHLOROthiazide (HYDRODIURIL) 12.5 mg tablet Take 12.5 mg by mouth daily.  rosuvastatin (CRESTOR) 10 mg tablet Take 10 mg by mouth nightly.  aspirin 81 mg chewable tablet Take 81 mg by mouth daily.  diclofenac (VOLTAREN) 1 % gel Apply 2 g to affected area four (4) times daily. (Patient not taking: Reported on 2021)    ibuprofen (MOTRIN) 600 mg tablet Take 1 Tab by mouth every six (6) hours as needed for Pain. (Patient not taking: Reported on 2021)    ibuprofen (MOTRIN) 600 mg tablet Take 1 Tab by mouth every six (6) hours as needed for Pain. (Patient not taking: Reported on 2021)     No current facility-administered medications for this visit. Allergies   Allergen Reactions    Tramadol Nausea and Vomiting     Social History     Occupational History    Not on file   Tobacco Use    Smoking status: Former Smoker     Packs/day: 0.25    Smokeless tobacco: Never Used   Substance and Sexual Activity    Alcohol use:  Yes     Alcohol/week: 1.7 standard drinks     Types: 2 Cans of beer per week    Drug use: Yes     Types: Marijuana    Sexual activity: Not on file     Family History   Problem Relation Age of Onset    Hypertension Other     Cancer Neg Hx     Diabetes Neg Hx     Heart Disease Neg Hx     Stroke Neg Hx         DIAGNOSTIC LAB DATA      No results found for: HBA1C, TSR4OYJQ, HQE1HXIH // No results found for: GLU, GLUCPOC     No results found for: RIO3MYYR, FWN6KWZD      No results found for: VITD3, XQVID2, XQVID3, XQVID, VD3RIA, ASTE70EQGQX     Drug Screen Most Recent Result Date    No resulted procedures found. REVIEW OF SYSTEMS : 1/19/2022  ALL BELOW ARE Negative except : SEE HPI     All other systems reviewed and are negative. 12 point review of systems otherwise negative unless noted in HPI. RADIOGRAPHS// IMAGING//DIAGNOSTIC DATA     Orders Placed This Encounter    Generic Supply Order          I have personally reviewed the images of the above study. The interpretation of this study is my professional opinion            I have spent 25 minutes reviewing the previous notes, reviewing diagnostic studies [Advanced  Imaging, Diagnostic test results (x-rays)] and had a direct face to face with the patient discussing the diagnosis and importance of compliance with the treatment and plan. There is  discussion for the potential for surgery, answering all questions, as well as documenting patient care coordination for this individual on the day of the visit. Disclaimer:     Sections of this note are dictated using utilizing voice recognition software, which may have resulted in some phonetic based errors in grammar and contents. Even though attempts were made to correct all the mistakes, some may have been missed, and remained in the body of the document. If questions arise, please contact our department. An electronic signature was used to authenticate this note. Nikko Gil may have a reminder for a \"due or due soon\" health maintenance. I have asked that she contact her primary care provider for follow-up on this health maintenance. There are no Patient Instructions on file for this visit. Please follow up with your PCP for any health maintenance as recommended.               Samantha Viveros, as dictated by, Sunil Sweet  1/19/2022  8:21 AM

## 2022-01-19 ENCOUNTER — OFFICE VISIT (OUTPATIENT)
Dept: ORTHOPEDIC SURGERY | Age: 57
End: 2022-01-19
Payer: MEDICAID

## 2022-01-19 VITALS
HEIGHT: 60 IN | WEIGHT: 271 LBS | OXYGEN SATURATION: 100 % | BODY MASS INDEX: 53.2 KG/M2 | HEART RATE: 76 BPM | TEMPERATURE: 96.9 F

## 2022-01-19 DIAGNOSIS — M19.072 PRIMARY OSTEOARTHRITIS OF LEFT FOOT: Primary | ICD-10-CM

## 2022-01-19 DIAGNOSIS — S90.112A CONTUSION OF LEFT GREAT TOE WITHOUT DAMAGE TO NAIL, INITIAL ENCOUNTER: ICD-10-CM

## 2022-01-19 PROCEDURE — 99213 OFFICE O/P EST LOW 20 MIN: CPT | Performed by: ORTHOPAEDIC SURGERY

## 2022-02-01 NOTE — ED PROVIDER NOTES
Pt c/o sob, started while smoking marijauna just pta, called 911, coughed at onset, no coughing curr. Sx;s better, mild. No h/o same. Smokes 2x per week, no cigarrete use, just marijauna. No cp, no abd pain. No leg pain or swelling. No weakness. No fever/chills. pmh of htn only. Pt denies anxiety. Past Medical History:   Diagnosis Date    Depression     Hypertension        Past Surgical History:   Procedure Laterality Date    HX  SECTION           Family History:   Problem Relation Age of Onset    Hypertension Other     Cancer Neg Hx     Diabetes Neg Hx     Heart Disease Neg Hx     Stroke Neg Hx        Social History     Socioeconomic History    Marital status:      Spouse name: Not on file    Number of children: Not on file    Years of education: Not on file    Highest education level: Not on file   Occupational History    Not on file   Social Needs    Financial resource strain: Not on file    Food insecurity:     Worry: Not on file     Inability: Not on file    Transportation needs:     Medical: Not on file     Non-medical: Not on file   Tobacco Use    Smoking status: Former Smoker     Packs/day: 0.25    Smokeless tobacco: Never Used   Substance and Sexual Activity    Alcohol use:  Yes     Alcohol/week: 1.7 standard drinks     Types: 2 Cans of beer per week    Drug use: Yes     Types: Marijuana    Sexual activity: Not on file   Lifestyle    Physical activity:     Days per week: Not on file     Minutes per session: Not on file    Stress: Not on file   Relationships    Social connections:     Talks on phone: Not on file     Gets together: Not on file     Attends Jainism service: Not on file     Active member of club or organization: Not on file     Attends meetings of clubs or organizations: Not on file     Relationship status: Not on file    Intimate partner violence:     Fear of current or ex partner: Not on file     Emotionally abused: Not on file Physically abused: Not on file     Forced sexual activity: Not on file   Other Topics Concern    Not on file   Social History Narrative    Not on file         ALLERGIES: Tramadol    Review of Systems   Constitutional: Negative for fever. HENT: Negative for congestion. Respiratory: Positive for shortness of breath. Cardiovascular: Negative for chest pain. Gastrointestinal: Negative for abdominal pain and vomiting. Musculoskeletal: Negative for back pain. Skin: Negative for rash. Neurological: Negative for light-headedness. All other systems reviewed and are negative. Vitals:    02/11/20 2110   BP: (!) 159/94   Pulse: 96   Resp: 16   Temp: 97.9 °F (36.6 °C)   SpO2: 97%   Weight: 121.1 kg (267 lb)   Height: 5' (1.524 m)            Physical Exam  Vitals signs and nursing note reviewed. Constitutional:       Appearance: She is well-developed. She is not diaphoretic. HENT:      Head: Normocephalic and atraumatic. Eyes:      Pupils: Pupils are equal, round, and reactive to light. Neck:      Musculoskeletal: Normal range of motion. Cardiovascular:      Rate and Rhythm: Normal rate and regular rhythm. Heart sounds: No murmur. Pulmonary:      Effort: Pulmonary effort is normal.      Breath sounds: No wheezing. Abdominal:      Palpations: Abdomen is soft. Tenderness: There is no abdominal tenderness. Musculoskeletal:         General: No tenderness. Skin:     General: Skin is dry. Capillary Refill: Capillary refill takes less than 2 seconds. Findings: No rash. Neurological:      Mental Status: She is alert and oriented to person, place, and time.           MDM       Procedures      Vitals:  Patient Vitals for the past 12 hrs:   Temp Pulse Resp BP SpO2   02/11/20 2110 97.9 °F (36.6 °C) 96 16 (!) 159/94 97 %         Medications ordered:   Medications   albuterol-ipratropium (DUO-NEB) 2.5 MG-0.5 MG/3 ML (3 mL Nebulization Given 2/11/20 2122)         Lab findings:  No results found for this or any previous visit (from the past 12 hour(s)). X-Ray, CT or other radiology findings or impressions:  XR CHEST PA LAT    (Results Pending)       Progress notes, Consult notes or additional Procedure notes:   10:07 PM pt declines further sx's, declines further w/u, feels back to nl and requesting dc. Lungs ctab, nl sats, mild htn, pt made aware. To dc per pt req. Initial w/u not c/w cad/pe/ptx/pna/dissection. Pt verb und of det ret inst given. Diagnosis:   1. Dyspnea, unspecified type        Disposition: home    Follow-up Information     Follow up With Specialties Details Why Contact Info    Darleen Camacho MD Internal Medicine Schedule an appointment as soon as possible for a visit in 2 days  [address] [phone]             Patient's Medications   Start Taking    ALBUTEROL (PROVENTIL HFA, VENTOLIN HFA, PROAIR HFA) 90 MCG/ACTUATION INHALER    Take 1-2 Puffs by inhalation every four (4) hours as needed for Wheezing. Continue Taking    ASPIRIN 81 MG CHEWABLE TABLET    Take 81 mg by mouth daily. HYDROCHLOROTHIAZIDE (HYDRODIURIL) 12.5 MG TABLET    Take 12.5 mg by mouth daily. IBUPROFEN (MOTRIN) 600 MG TABLET    Take 1 Tab by mouth every six (6) hours as needed for Pain. ROSUVASTATIN (CRESTOR) 10 MG TABLET    Take 10 mg by mouth nightly. These Medications have changed    No medications on file   Stop Taking    AZILSARTAN MEDOXOMIL (EDARBI) 80 MG TAB TABLET    Take  by mouth daily. Complex Repair And Flap Additional Text (Will Appearing After The Standard Complex Repair Text): The complex repair was not sufficient to completely close the primary defect. The remaining additional defect was repaired with the flap mentioned below.

## 2023-01-10 ENCOUNTER — OFFICE VISIT (OUTPATIENT)
Dept: ORTHOPEDIC SURGERY | Age: 58
End: 2023-01-10
Payer: MEDICAID

## 2023-01-10 VITALS — BODY MASS INDEX: 51.6 KG/M2 | OXYGEN SATURATION: 93 % | WEIGHT: 264.2 LBS | HEART RATE: 75 BPM | TEMPERATURE: 97.3 F

## 2023-01-10 DIAGNOSIS — R20.0 NUMBNESS AND TINGLING OF LEFT HAND: ICD-10-CM

## 2023-01-10 DIAGNOSIS — R20.2 NUMBNESS AND TINGLING OF LEFT HAND: ICD-10-CM

## 2023-01-10 DIAGNOSIS — G56.22 CUBITAL TUNNEL SYNDROME ON LEFT: Primary | ICD-10-CM

## 2023-01-10 PROCEDURE — 99214 OFFICE O/P EST MOD 30 MIN: CPT | Performed by: ORTHOPAEDIC SURGERY

## 2023-01-10 RX ORDER — ATORVASTATIN CALCIUM 40 MG/1
TABLET, FILM COATED ORAL
COMMUNITY
Start: 2022-12-09

## 2023-01-10 RX ORDER — LIDOCAINE 4 G/100G
PATCH TOPICAL
COMMUNITY
Start: 2022-11-29

## 2023-01-10 RX ORDER — IBUPROFEN 800 MG/1
TABLET ORAL
COMMUNITY
Start: 2022-11-29

## 2023-01-10 RX ORDER — ERGOCALCIFEROL 1.25 MG/1
CAPSULE ORAL
COMMUNITY
Start: 2022-12-29

## 2023-01-10 RX ORDER — FLUOCINONIDE 0.5 MG/G
CREAM TOPICAL
COMMUNITY
Start: 2022-10-09

## 2023-01-10 RX ORDER — LOSARTAN POTASSIUM 100 MG/1
TABLET ORAL
COMMUNITY
Start: 2022-12-13

## 2023-01-10 RX ORDER — METHYLPREDNISOLONE 4 MG/1
TABLET ORAL
COMMUNITY
Start: 2022-11-29

## 2023-01-10 RX ORDER — TOLNAFTATE 10 MG/G
CREAM TOPICAL
COMMUNITY
Start: 2022-10-10

## 2023-01-10 NOTE — PROGRESS NOTES
Patient: Mariela Prater                MRN: [id number]       SSN: [id number]  YOB: 1965        AGE: 56 y.o. SEX: female  Body mass index is 51.6 kg/m². PCP: Ollie Coleman NP  01/10/23    CHIEF COMPLAINT: Left hand numbness/tingling    HPI: Mariela Prater is a 56 y.o. female patient who presents to the office today with several months of left hand numbness and tingling. She says the numbness and tingling radiates from her elbow down to her hand in the ulnar nerve distribution. She also describes some numbness and tingling in the long finger. She denies any injury or trauma. She notices it more with her elbow bent and with her elbow straight. Past Medical History:   Diagnosis Date    Depression     Hypertension        Family History   Problem Relation Age of Onset    Hypertension Other     Cancer Neg Hx     Diabetes Neg Hx     Heart Disease Neg Hx     Stroke Neg Hx        Current Outpatient Medications   Medication Sig Dispense Refill    atorvastatin (LIPITOR) 40 mg tablet TAKE 1 TABLET BY MOUTH ONCE DAILY FOR 90 DAYS      ergocalciferol (ERGOCALCIFEROL) 1,250 mcg (50,000 unit) capsule TAKE 1 CAPSULE BY MOUTH THREE TIMES A WEEK      fluocinoNIDE (LIDEX) 0.05 % topical cream       ibuprofen (MOTRIN) 800 mg tablet TAKE 1 TABLET BY MOUTH THREE TIMES DAILY WITH FOOD OR MILK; TAKE AFTER COMPLETION OF STEROID.      lidocaine 4 % patch APPLY TOPICALLY AS DIRECTED ONCE DAILY.  WEAR FOR 12 HOURS AND REMAIN PATCH FREE FOR 12 HOURS      losartan (COZAAR) 100 mg tablet TAKE 1 TABLET BY MOUTH ONCE DAILY FOR 90 DAYS      methylPREDNISolone (MEDROL DOSEPACK) 4 mg tablet TAKE AS DIRECTED WITH FOOD OR MILK ONCE DAILY (6-5-4-3-2-1) DO NOT TAKE TABLETS WITH NSAIDS      tolnaftate (TINACTIN) 1 % topical cream       losartan-hydroCHLOROthiazide (HYZAAR) 50-12.5 mg per tablet TAKE 1 TABLET BY MOUTH ONCE DAILY FOR BLOOD PRESSURE FOR 30 DAYS      meloxicam (Mobic) 15 mg tablet Take 1 Tablet by mouth daily. 30 Tablet 0    albuterol (PROVENTIL HFA, VENTOLIN HFA, PROAIR HFA) 90 mcg/actuation inhaler Take 1-2 Puffs by inhalation every four (4) hours as needed for Wheezing. 1 Inhaler 0    hydroCHLOROthiazide (HYDRODIURIL) 12.5 mg tablet Take 12.5 mg by mouth daily. (Patient not taking: Reported on 2022)      rosuvastatin (CRESTOR) 10 mg tablet Take 10 mg by mouth nightly. aspirin 81 mg chewable tablet Take 81 mg by mouth daily. Allergies   Allergen Reactions    Tramadol Nausea and Vomiting       Past Surgical History:   Procedure Laterality Date    COLONOSCOPY N/A 2022    SCREENING COLONOSCOPY performed by Maty Herbert MD at [facility] ENDOSCOPY    HX  SECTION         Social History     Socioeconomic History    Marital status:      Spouse name: Not on file    Number of children: Not on file    Years of education: Not on file    Highest education level: Not on file   Occupational History    Not on file   Tobacco Use    Smoking status: Former     Packs/day: 0.25     Types: Cigarettes    Smokeless tobacco: Never   Substance and Sexual Activity    Alcohol use:  Yes     Alcohol/week: 1.7 standard drinks     Types: 2 Cans of beer per week    Drug use: Yes     Types: Marijuana    Sexual activity: Not on file   Other Topics Concern    Not on file   Social History Narrative    Not on file     Social Determinants of Health     Financial Resource Strain: Not on file   Food Insecurity: Not on file   Transportation Needs: Not on file   Physical Activity: Not on file   Stress: Not on file   Social Connections: Not on file   Intimate Partner Violence: Not on file   Housing Stability: Not on file       REVIEW OF SYSTEMS:    14 point review of systems on the intake form is negative except as noted in the HPI    PHYSICAL EXAMINATION:  Visit Vitals  Pulse 75   Temp 97.3 °F (36.3 °C) (Temporal)   Wt 264 lb 3.2 oz (119.8 kg)   SpO2 93%   BMI 51.60 kg/m²     Body mass index is 51.6 kg/m².    GENERAL: Alert and oriented x3, in no acute distress, well-developed, well-nourished. HEENT: Normocephalic, atraumatic. Elbow Exam   R   L  ROM Active      Flexion   Full   Full   Extension  Full   Full   Pronation  Full   Full   Supination  Full   Full  ROM  Passive   Flexion   Full   Full   Extension  Full   Full   Pronation  Full   Full   Supination  Full   Full  Tenderness to palpation   Medial Epicondyle -   -   Lateral Epicondyle -   -  Tinel Sign Cubital Tunnel -   +  Ulnar Nerve Subluxation -   +  Distal Biceps Abnormality -   -  Triceps Abnormality  -   -  Other tenderness  -   -  Other Deformity  -   -  Olecranon Bursitis  -   -  Warmth   -   -  Erythema   -   -  Additional Findings: None      IMAGING:  Imaging read by myself and interpreted as follows:      ASSESSMENT & PLAN  Diagnosis: Left cubital tunnel syndrome    Deandra Salgado has cubital tunnel syndrome that is symptomatic. She also has some crossover with nerves affecting her long finger. I have recommended an EMG of the left upper extremity to further evaluate this and ordered that today. I would like to see her back after the EMG is completed. Prescription medication management discussed. Electronically signed by: Zaina Bonilla MD    Note: This note was completed using voice recognition software.   Any typographical/name errors or mistakes are unintentional.

## 2023-01-13 ENCOUNTER — TELEPHONE (OUTPATIENT)
Dept: ORTHOPEDIC SURGERY | Age: 58
End: 2023-01-13

## 2023-01-13 RX ORDER — METHYLPREDNISOLONE 4 MG/1
TABLET ORAL
Qty: 1 DOSE PACK | Refills: 0 | Status: SHIPPED | OUTPATIENT
Start: 2023-01-13

## 2023-01-13 NOTE — TELEPHONE ENCOUNTER
Pt called asking if she is able to get pain medication for her lft hand. Had an appt on 1/10 and states Dr Ybarra [place] would provide her something for the pain.      Please advise pt at [phone]

## 2023-01-24 DIAGNOSIS — G56.22 CUBITAL TUNNEL SYNDROME ON LEFT: ICD-10-CM

## 2023-02-14 ENCOUNTER — PROCEDURE VISIT (OUTPATIENT)
Age: 58
End: 2023-02-14

## 2023-02-14 VITALS
BODY MASS INDEX: 52.22 KG/M2 | DIASTOLIC BLOOD PRESSURE: 84 MMHG | HEART RATE: 80 BPM | TEMPERATURE: 98.8 F | HEIGHT: 60 IN | WEIGHT: 266 LBS | OXYGEN SATURATION: 100 % | RESPIRATION RATE: 18 BRPM | SYSTOLIC BLOOD PRESSURE: 126 MMHG

## 2023-02-14 DIAGNOSIS — R20.2 NUMBNESS AND TINGLING OF LEFT UPPER EXTREMITY: Primary | ICD-10-CM

## 2023-02-14 DIAGNOSIS — R20.0 NUMBNESS AND TINGLING OF LEFT UPPER EXTREMITY: Primary | ICD-10-CM

## 2023-02-14 PROBLEM — R53.82 CHRONIC FATIGUE: Status: ACTIVE | Noted: 2017-05-16

## 2023-02-14 PROBLEM — R42 VERTIGO: Status: ACTIVE | Noted: 2019-05-16

## 2023-02-14 PROBLEM — R51.9 CHRONIC LEFT-SIDED HEADACHES: Status: ACTIVE | Noted: 2018-03-13

## 2023-02-14 PROBLEM — J30.9 CHRONIC ALLERGIC RHINITIS: Status: ACTIVE | Noted: 2019-02-06

## 2023-02-14 PROBLEM — L03.012 PARONYCHIA OF FINGER, LEFT: Status: ACTIVE | Noted: 2018-03-13

## 2023-02-14 PROBLEM — B35.3 TINEA PEDIS OF BOTH FEET: Status: ACTIVE | Noted: 2017-05-16

## 2023-02-14 PROBLEM — M62.838 MUSCLE SPASM: Status: ACTIVE | Noted: 2019-05-16

## 2023-02-14 PROBLEM — E78.2 MIXED HYPERLIPIDEMIA: Status: ACTIVE | Noted: 2018-11-09

## 2023-02-14 PROBLEM — R73.03 PRE-DIABETES: Status: ACTIVE | Noted: 2017-05-16

## 2023-02-14 PROBLEM — R31.21 ASYMPTOMATIC MICROSCOPIC HEMATURIA: Status: ACTIVE | Noted: 2019-02-06

## 2023-02-14 PROBLEM — I10 ESSENTIAL HYPERTENSION, BENIGN: Status: ACTIVE | Noted: 2017-05-16

## 2023-02-14 PROBLEM — G89.29 CHRONIC LEFT-SIDED HEADACHES: Status: ACTIVE | Noted: 2018-03-13

## 2023-02-14 NOTE — PROGRESS NOTES
[address]  [address], [address]  [address]  Phone: [phone]  Fax: [phone]    Cruzito Naqvi  : 1965  PCP: YI Armstrong NP  2023    ELECTROMYOGRAPHY AND NERVE CONDUCTION STUDIES    Mahsa Hernandez was referred by Dr. Josselin Waddell for electrodiagnostic evaluation of numbness and tingling of LUE    NCV & EMG Findings:  Evaluation of the left median-ulnar (dig IV) sensory nerve showed abnormal peak latency difference ((Median Wrist)-(Ulnar Wrist), 0.50 ms). All remaining nerves (as indicated in the following tables) were within normal limits. INTERPRETATION  This is an abnormal electrodiagnostic examination. These findings may be consistent with:  Mild median mononeuropathy at the left wrist (carpal tunnel syndrome)     There are no electrodiagnostic findings consistent with lumbar or cervical radiculopathy, brachial or lumbosacral plexopathy, myopathy, polyneuropathy or any other mononeuropathy. CLINICAL INTERPRETATION  Her electrodiagnostic finding of left median mononeuropathy at the wrist does not appear consistent with her left arm symptoms. There is no sign of C8 radiculopathy or ulnar mononeuropathy to explain her medial hand symptoms. HISTORY OF PRESENT ILLNESS  Mahsa Hernandez is a 56 y.o. female. Pt presents today with LUE EMG evaluation for numbness and tingling that radiates from her elbow to her left hand for the last several months. Notes she experiences sxs the most in her pinky and ring finger. Pt notes of pain of neck radiating down her LUE upon certain neck movements. PAST MEDICAL HISTORY   Past Medical History:   Diagnosis Date    Depression     Hypertension        Past Surgical History:   Procedure Laterality Date     SECTION      COLONOSCOPY N/A 2022    SCREENING COLONOSCOPY performed by Yvrose Watson MD at [phone] [facility]   .       MEDICATIONS    Current Outpatient Medications   Medication Sig Dispense Refill    albuterol sulfate HFA (PROVENTIL;VENTOLIN;PROAIR) 108 (90 Base) MCG/ACT inhaler Inhale 1-2 puffs into the lungs every 4 hours as needed      aspirin 81 MG chewable tablet Take 81 mg by mouth daily      atorvastatin (LIPITOR) 40 MG tablet TAKE 1 TABLET BY MOUTH ONCE DAILY FOR 90 DAYS      ergocalciferol (ERGOCALCIFEROL) 1.25 MG (01864 UT) capsule TAKE 1 CAPSULE BY MOUTH THREE TIMES A WEEK      fluocinonide (LIDEX) 0.05 % cream ceived the following from [facility]: Outside name: fluocinoNIDE (LIDEX) 0.05 % topical cream      hydroCHLOROthiazide (HYDRODIURIL) 12.5 MG tablet Take 12.5 mg by mouth daily      ibuprofen (ADVIL;MOTRIN) 800 MG tablet TAKE 1 TABLET BY MOUTH THREE TIMES DAILY WITH FOOD OR MILK; TAKE AFTER COMPLETION OF STEROID.      lidocaine 4 % external patch APPLY TOPICALLY AS DIRECTED ONCE DAILY. WEAR FOR 12 HOURS AND REMAIN PATCH FREE FOR 12 HOURS      losartan (COZAAR) 100 MG tablet TAKE 1 TABLET BY MOUTH ONCE DAILY FOR 90 DAYS      losartan-hydroCHLOROthiazide (HYZAAR) 50-12.5 MG per tablet TAKE 1 TABLET BY MOUTH ONCE DAILY FOR BLOOD PRESSURE FOR 30 DAYS      meloxicam (MOBIC) 15 MG tablet Take 15 mg by mouth daily      methylPREDNISolone (MEDROL DOSEPACK) 4 MG tablet TAKE AS DIRECTED WITH FOOD OR MILK ONCE DAILY (6-5-4-3-2-1) DO NOT TAKE TABLETS WITH NSAIDS      rosuvastatin (CRESTOR) 10 MG tablet Take 10 mg by mouth      tolnaftate (TINACTIN) 1 % cream ceived the following from [facility]: Outside name: tolnaftate (TINACTIN) 1 % topical cream       No current facility-administered medications for this visit.        ALLERGIES  Allergies   Allergen Reactions    Tramadol Nausea And Vomiting     Other reaction(s): gi distress  N&V          SOCIAL HISTORY    Social History     Socioeconomic History    Marital status:      Spouse name: None    Number of children: None    Years of education: None    Highest education  level: None   Tobacco Use    Smoking status: Former     Packs/day: 0.25     Types: Cigarettes    Smokeless tobacco: Never   Substance and Sexual Activity    Alcohol use: Yes     Alcohol/week: 1.7 standard drinks    Drug use: Yes     Types: Marijuana Ester Ronen)       FAMILY HISTORY  Family History   Problem Relation Age of Onset    Cancer Neg Hx     Stroke Neg Hx     Heart Disease Neg Hx     Diabetes Neg Hx     Hypertension Other          PHYSICAL EXAMINATION  BP (!) 136/93   Pulse 80   Temp 98.8 °F (37.1 °C) (Temporal)   Resp 18   Ht 5' (1.524 m)   Wt 266 lb (120.7 kg)   SpO2 100%   BMI 51.95 kg/m²        AMB PAIN ASSESSMENT 2/14/2023   Location of Pain Arm   Location Modifiers Left   Severity of Pain 8   Quality of Pain Dull;Aching   Duration of Pain Persistent   Frequency of Pain Constant   Aggravating Factors Other (Comment)   Limiting Behavior Some   Relieving Factors Other (Comment); Heat   Result of Injury Yes   Work-Related Injury No           Constitutional:  Well developed, well nourished, in no acute distress. Psychiatric: Affect and mood are appropriate. Integumentary: No rashes or abrasions noted on exposed areas. SPINE/MUSCULOSKELETAL EXAM    On brief examination: None.       NCV & EMG Findings:  NCS+  Motor Nerve Results      Latency Amplitude F-Lat Segment Distance CV Comment   Site (ms) Norm (mV) Norm (ms)  (cm) (m/s) Norm    Left Median (APB) Motor   Wrist 4.3  < 4.4 10.5  > 4.2  Wrist-APB 8      Elbow 7.4 - 9.2 -  Elbow-Wrist 18 58  > 51    Left Ulnar (ADM) Motor   Wrist 2.6  < 3.7 10.9  > 7.9  Wrist-ADM 8      Bel Elbow 5.3 - 9.7 -  Bel Elbow-Wrist 17.5 65  > 52    Abv Elbow 7.1 - 9.2 -  Abv Elbow-Bel Elbow 10 56  > 43      Sensory Sites       Latency (Onset) Latency (Peak)  Amplitude (O-P) Segment Distance CV (Onset) Comment   Site ms Norm (ms) Norm µV Norm  mm m/s Norm    Left Median Sensory   Wrist-Dig II 3.1  < 3.3 3.8  < 4.0 42  > 7 Wrist-Dig II 14 45 -    Left Median-Ulnar (Dig IV) Sensory        Median   Wrist-Dig IV 2.8 - 3.5  < 4.1 30 - Wrist-Dig IV 14 50 -         Ulnar   Wrist-Dig IV 2.3 - 3.0  < 3.9 31 - Wrist-Dig IV 14 61 -    Left Radial Sensory   Forearm-Wrist 1.23  < 2.2 2.0  < 2.8 80  > 7 Forearm-Wrist 10 81 -    Left Ulnar Sensory   Wrist-Dig V 2.5  < 3.1 3.2  < 4.0 32  > 7 Wrist-Dig V 14 56 -      Inter-Nerve Comparisons     Nerve 1 Value 1 Nerve 2 Value 2 Parameter Result Normal   Sensory Sites   L Median Wrist-Dig IV 3.5 ms L Ulnar Wrist-Dig IV 3.0 ms Peak Lat Diff 0.50 ms <0.40     EMG+     Side Muscle Nerve Root Ins Act Fibs Psw Fascics Other Amp Dur Poly Recrt Activation Comment Misc   Left Biceps Musculocut C5-C6 Nml Nml Nml Nml 0 Nml Nml 0 Nml Nml     Left Triceps Radial C6-C8 Nml Nml Nml Nml 0 Nml Nml 0 Nml Nml     Left Pronator Teres Median C6-C7 Nml Nml Nml Nml 0 Nml Nml 0 Nml Nml     Left FDI Median,  Ulnar C8-T1 Nml Nml Nml Nml 0 Nml Nml 0 Nml Nml     Left APB Median C8-T1 Nml Nml Nml Nml 0 Nml Nml 0 Nml Nml     Left Cervical Parasp (Upper) Rami C1-C3 Nml Nml Nml Nml 0          Left Cervical Parasp (Mid) Rami C4-C6 Nml Nml Nml Nml 0          Left Cervical Parasp (Lower) Rami C7-C8 Nml Nml Nml Nml 0                  Waveforms:    Motor         Sensory                      [facility]  OFFICE PROCEDURE PROGRESS NOTE        Chart reviewed for the following:   Rhina GARRIDO, have reviewed the History, Physical and updated the Allergic reactions for [address] performed immediately prior to start of procedure:   Rhina GARRIDO, have performed the following reviews on Karoline Batch prior to the start of the procedure:            * Patient was identified by name and date of birth   * Agreement on procedure being performed was verified  * Risks and Benefits explained to the patient  * Procedure site verified and marked as necessary  * Patient was positioned for comfort  * Consent was signed and verified     Time: 5:09 PM     Date of procedure: 2/14/2023    Procedure performed by:  Jarvis Escobar MD    Provider accompanied by: Iza.     Patient accompanied by another individual: No    How tolerated by patient: tolerated the procedure well with no complications    Post Procedural Pain Scale: 0 - No Hurt    Comments: none    Written by Moreno Pickens as dictated by Walter Bonilla MD

## 2023-03-14 ENCOUNTER — OFFICE VISIT (OUTPATIENT)
Age: 58
End: 2023-03-14

## 2023-03-14 VITALS — BODY MASS INDEX: 51.95 KG/M2 | HEIGHT: 60 IN | RESPIRATION RATE: 20 BRPM

## 2023-03-14 DIAGNOSIS — M54.12 LEFT CERVICAL RADICULOPATHY: Primary | ICD-10-CM

## 2023-03-14 DIAGNOSIS — G56.02 LEFT CARPAL TUNNEL SYNDROME: ICD-10-CM

## 2023-03-14 DIAGNOSIS — G56.22 CUBITAL TUNNEL SYNDROME ON LEFT: ICD-10-CM

## 2023-03-14 NOTE — PROGRESS NOTES
[facility] AMBULATORY OFFICE NOTE    Patient: Liz De                MRN: [id number]       SSN: [id number]  YOB: 1965        AGE: 56 y.o. SEX: female  Body mass index is 51.95 kg/m². PCP: YI Baig NP  03/14/23    Chief Complaint: Left arm follow-up    HPI: Liz De is a 56 y.o. female patient who returns to the office today for her left arm. She continues to complain of pain that radiates from her neck down her arm. She also describes numbness and tingling in the ulnar nerve distribution. She had her EMG completed. Past Medical History:   Diagnosis Date    Depression     Hypertension        Family History   Problem Relation Age of Onset    Cancer Neg Hx     Stroke Neg Hx     Heart Disease Neg Hx     Diabetes Neg Hx     Hypertension Other        Current Outpatient Medications   Medication Sig Dispense Refill    albuterol sulfate HFA (PROVENTIL;VENTOLIN;PROAIR) 108 (90 Base) MCG/ACT inhaler Inhale 1-2 puffs into the lungs every 4 hours as needed      aspirin 81 MG chewable tablet Take 81 mg by mouth daily      atorvastatin (LIPITOR) 40 MG tablet TAKE 1 TABLET BY MOUTH ONCE DAILY FOR 90 DAYS      ergocalciferol (ERGOCALCIFEROL) 1.25 MG (59174 UT) capsule TAKE 1 CAPSULE BY MOUTH THREE TIMES A WEEK      fluocinonide (LIDEX) 0.05 % cream ceived the following from [facility]: Outside name: fluocinoNIDE (LIDEX) 0.05 % topical cream      hydroCHLOROthiazide (HYDRODIURIL) 12.5 MG tablet Take 12.5 mg by mouth daily      ibuprofen (ADVIL;MOTRIN) 800 MG tablet TAKE 1 TABLET BY MOUTH THREE TIMES DAILY WITH FOOD OR MILK; TAKE AFTER COMPLETION OF STEROID.      lidocaine 4 % external patch APPLY TOPICALLY AS DIRECTED ONCE DAILY.  WEAR FOR 12 HOURS AND REMAIN PATCH FREE FOR 12 HOURS      losartan (COZAAR) 100 MG tablet TAKE 1 TABLET BY MOUTH ONCE DAILY FOR 90 DAYS      losartan-hydroCHLOROthiazide (HYZAAR) 50-12.5 MG per tablet TAKE 1 TABLET BY MOUTH ONCE DAILY FOR BLOOD PRESSURE FOR 30 DAYS      meloxicam (MOBIC) 15 MG tablet Take 15 mg by mouth daily      methylPREDNISolone (MEDROL DOSEPACK) 4 MG tablet TAKE AS DIRECTED WITH FOOD OR MILK ONCE DAILY (6-5-4-3-2-1) DO NOT TAKE TABLETS WITH NSAIDS      rosuvastatin (CRESTOR) 10 MG tablet Take 10 mg by mouth      tolnaftate (TINACTIN) 1 % cream ceived the following from [facility]: Outside name: tolnaftate (TINACTIN) 1 % topical cream       No current facility-administered medications for this visit. Allergies   Allergen Reactions    Tramadol Nausea And Vomiting     Other reaction(s): gi distress  N&V       Past Surgical History:   Procedure Laterality Date     SECTION      COLONOSCOPY N/A 2022    SCREENING COLONOSCOPY performed by Selena Irizarry MD at [facility] ENDOSCOPY       Social History     Socioeconomic History    Marital status:      Spouse name: Not on file    Number of children: Not on file    Years of education: Not on file    Highest education level: Not on file   Occupational History    Not on file   Tobacco Use    Smoking status: Former     Packs/day: 0.25     Types: Cigarettes    Smokeless tobacco: Never   Substance and Sexual Activity    Alcohol use: Yes     Alcohol/week: 1.7 standard drinks    Drug use: Yes     Types: Marijuana Celia Palm)    Sexual activity: Not on file   Other Topics Concern    Not on file   Social History Narrative    Not on file     Social Determinants of Health     Financial Resource Strain: Not on file   Food Insecurity: Not on file   Transportation Needs: Not on file   Physical Activity: Not on file   Stress: Not on file   Social Connections: Not on file   Intimate Partner Violence: Not on file   Housing Stability: Not on file       REVIEW OF SYSTEMS:      No changes from previous review of systems unless noted.     PHYSICAL EXAMINATION:  Resp 20   Ht 5' (1.524 m)   BMI 51.95 kg/m²   Body mass index is 51.95 kg/m².  GENERAL: Alert and oriented x3, in no acute distress. HEENT: Normocephalic, atraumatic. Elbow Exam   R   L  ROM       Flexion   Full   Full   Extension  Full   Full   Pronation  Full   Full   Supination  Full   Full  Tenderness to palpation   Medial Epicondyle -   -   Lateral Epicondyle -   -  Tinel Sign Cubital Tunnel -   +  Ulnar Nerve Subluxation -   +  Distal Biceps Abnormality -   -  Triceps Abnormality  -   -  Other tenderness  -   -  Other Deformity  -   -  Olecranon Bursitis  -   -  Warmth   -   -  Erythema   -   -  Additional Findings: None      IMAGING:  Imaging read by myself and interpreted as follows:  EMG was reviewed which shows left carpal tunnel syndrome but no cubital tunnel abnormality or ulnar nerve abnormalities noted. Also no significant spine abnormality noted on the EMG. ASSESSMENT & PLAN  Diagnosis: Left arm cubital tunnel, carpal tunnel, cervical radiculopathy    Cordelia's complaints seem to be stemming more from her neck as she has pain that radiates down her arm from her neck all the way to her fingertips. She also has cubital tunnel on examination but not by EMG and carpal tunnel on EMG. I have recommended follow-up with our spine service to tease out the cervical spine pathology before she considers any surgery if at all on her left cubital tunnel. She is in agreement with this plan. Follow-up after she sees our spine team.  Prescription medication management discussed with patient. Electronically signed by: Hal Woodard MD     Note: This note was completed using voice recognition software.   Any typographical/name errors or mistakes are unintentional.

## 2023-04-25 ENCOUNTER — OFFICE VISIT (OUTPATIENT)
Age: 58
End: 2023-04-25
Payer: COMMERCIAL

## 2023-04-25 VITALS
WEIGHT: 266 LBS | HEIGHT: 60 IN | HEART RATE: 80 BPM | OXYGEN SATURATION: 98 % | BODY MASS INDEX: 52.22 KG/M2 | TEMPERATURE: 98.2 F

## 2023-04-25 DIAGNOSIS — M47.812 CERVICAL FACET JOINT SYNDROME: ICD-10-CM

## 2023-04-25 DIAGNOSIS — M47.816 LUMBAR FACET ARTHROPATHY: ICD-10-CM

## 2023-04-25 DIAGNOSIS — M54.9 BACK PAIN, UNSPECIFIED BACK LOCATION, UNSPECIFIED BACK PAIN LATERALITY, UNSPECIFIED CHRONICITY: ICD-10-CM

## 2023-04-25 DIAGNOSIS — G56.02 LEFT CARPAL TUNNEL SYNDROME: ICD-10-CM

## 2023-04-25 DIAGNOSIS — M54.2 NECK PAIN: Primary | ICD-10-CM

## 2023-04-25 DIAGNOSIS — I70.90 ATHEROSCLEROSIS: ICD-10-CM

## 2023-04-25 DIAGNOSIS — M54.12 LEFT CERVICAL RADICULOPATHY: ICD-10-CM

## 2023-04-25 DIAGNOSIS — R20.0 LEFT ARM NUMBNESS: ICD-10-CM

## 2023-04-25 PROCEDURE — 72110 X-RAY EXAM L-2 SPINE 4/>VWS: CPT | Performed by: PHYSICAL MEDICINE & REHABILITATION

## 2023-04-25 PROCEDURE — 72040 X-RAY EXAM NECK SPINE 2-3 VW: CPT | Performed by: PHYSICAL MEDICINE & REHABILITATION

## 2023-04-25 PROCEDURE — 99214 OFFICE O/P EST MOD 30 MIN: CPT | Performed by: PHYSICAL MEDICINE & REHABILITATION

## 2023-04-25 RX ORDER — PREGABALIN 50 MG/1
CAPSULE ORAL
Qty: 90 CAPSULE | Refills: 1 | Status: SHIPPED | OUTPATIENT
Start: 2023-04-25 | End: 2023-05-25

## 2023-04-25 ASSESSMENT — PATIENT HEALTH QUESTIONNAIRE - PHQ9
2. FEELING DOWN, DEPRESSED OR HOPELESS: 1
1. LITTLE INTEREST OR PLEASURE IN DOING THINGS: 0
SUM OF ALL RESPONSES TO PHQ QUESTIONS 1-9: 1
SUM OF ALL RESPONSES TO PHQ QUESTIONS 1-9: 1
SUM OF ALL RESPONSES TO PHQ9 QUESTIONS 1 & 2: 1
SUM OF ALL RESPONSES TO PHQ QUESTIONS 1-9: 1
SUM OF ALL RESPONSES TO PHQ QUESTIONS 1-9: 1

## 2023-04-25 NOTE — PATIENT INSTRUCTIONS
Patient Education        Low Back Arthritis: Exercises  Introduction  Here are some examples of typical rehabilitation exercises for your condition. Start each exercise slowly. Ease off the exercise if you start to have pain. Your doctor or physical therapist will tell you when you can start these exercises and which ones will work best for you. When you are not being active, find a comfortable position for rest. Some people are comfortable on the floor or a medium-firm bed with a small pillow under their head and another under their knees. Some people prefer to lie on their side with a pillow between their knees. Don't stay in one position for too long. Take short walks (10 to 20 minutes) every 2 to 3 hours. Avoid slopes, hills, and stairs until you feel better. Walk only distances you can manage without pain, especially leg pain. How to do the exercises  Pelvic tilt    Lie on your back with your knees bent. \"Brace\" your stomach--tighten your muscles by pulling in and imagining your belly button moving toward your spine. Press your lower back into the floor. You should feel your hips and pelvis rock back. Hold for 6 seconds while breathing smoothly. Relax and allow your pelvis and hips to rock forward. Repeat 8 to 12 times. Back stretches    Get down on your hands and knees on the floor. Relax your head and allow it to droop. Round your back up toward the ceiling until you feel a nice stretch in your upper, middle, and lower back. Hold this stretch for as long as it feels comfortable, or about 15 to 30 seconds. Return to the starting position with a flat back while you are on your hands and knees. Let your back sway by pressing your stomach toward the floor. Lift your buttocks toward the ceiling. Hold this position for 15 to 30 seconds. Repeat 2 to 4 times. Follow-up care is a key part of your treatment and safety.  Be sure to make and go to all appointments, and call your doctor if you are having

## 2023-04-25 NOTE — PROGRESS NOTES
Charles Simpson presents today for   Chief Complaint   Patient presents with    Back Pain       Is someone accompanying this pt? no    Is the patient using any DME equipment during OV? no    Depression Screening:  No flowsheet data found. Learning Assessment:  No flowsheet data found. Abuse Screening:  No flowsheet data found. Fall Risk  No flowsheet data found. OPIOID RISK TOOL  No flowsheet data found. Coordination of Care:  1. Have you been to the ER, urgent care clinic since your last visit? no  Hospitalized since your last visit? no    2. Have you seen or consulted any other health care providers outside of the [address] [place] [address] since your last visit? no Include any pap smears or colon screening.  no
tablet, TAKE AS DIRECTED WITH FOOD OR MILK ONCE DAILY (6-5-4-3-2-1) DO NOT TAKE TABLETS WITH NSAIDS (Patient not taking: Reported on 4/25/2023), Disp: , Rfl:     rosuvastatin (CRESTOR) 10 MG tablet, Take 1 tablet by mouth, Disp: , Rfl:     tolnaftate (TINACTIN) 1 % cream, ceived the following from [facility]: Outside name: tolnaftate (TINACTIN) 1 % topical cream, Disp: , Rfl:       Allergies   Allergen Reactions    Tramadol Nausea And Vomiting     Other reaction(s): gi distress  N&V       REVIEW OF SYSTEMS    Constitutional: Negative for fever, chills, or weight change. Respiratory: Negative for cough or shortness of breath. Cardiovascular: Negative for chest pain or palpitations. Gastrointestinal: Negative for acid reflux, change in bowel habits, or constipation. Genitourinary: Negative for dysuria and flank pain. Musculoskeletal: Positive for cervical, lumbar, and left upper extremity pain. Skin: Negative for rash. Neurological: Negative for headaches, dizziness. Positive for left upper extremity numbness. Endo/Heme/Allergies: Negative for increased bruising. Psychiatric/Behavioral: Negative for difficulty with sleep. As per HPI    PHYSICAL EXAMINATION  Pulse 80   Temp 98.2 °F (36.8 °C) (Temporal)   Ht 5' (1.524 m)   Wt 266 lb (120.7 kg)   LMP  (LMP Unknown)   SpO2 98%   BMI 51.95 kg/m²     Constitutional: Awake, alert, and in no acute distress. HEENT: Normocephalic. Atraumatic. Oropharynx is moist and clear. PERRL. EOMI. Sclerae are nonicteric  Cardiovascular: Regular rate and rhythm  Lungs: Clear to auscultation bilaterally  Abdomen: Soft and nontender. Bowel sounds are present  Neurological: 1+ symmetrical DTRs in the upper extremities. 1+ symmetrical DTRs in the lower extremities. Sensation to light touch is intact. Negative Henriquez's sign bilaterally. Skin: warm, dry, and intact. Musculoskeletal: Tenderness to palpation across the upper trapezius region (L>R).  Pain

## 2023-05-25 ENCOUNTER — HOSPITAL ENCOUNTER (OUTPATIENT)
Facility: HOSPITAL | Age: 58
Setting detail: RECURRING SERIES
Discharge: HOME | End: 2023-05-28
Payer: COMMERCIAL

## 2023-05-25 PROCEDURE — 97161 PT EVAL LOW COMPLEX 20 MIN: CPT

## 2023-06-15 ENCOUNTER — HOSPITAL ENCOUNTER (OUTPATIENT)
Facility: HOSPITAL | Age: 58
Setting detail: RECURRING SERIES
Discharge: HOME | End: 2023-06-18
Payer: COMMERCIAL

## 2023-06-15 PROCEDURE — 97113 AQUATIC THERAPY/EXERCISES: CPT

## 2023-06-20 ENCOUNTER — APPOINTMENT (OUTPATIENT)
Facility: HOSPITAL | Age: 58
End: 2023-06-20
Payer: COMMERCIAL

## 2023-06-22 ENCOUNTER — HOSPITAL ENCOUNTER (OUTPATIENT)
Facility: HOSPITAL | Age: 58
Setting detail: RECURRING SERIES
Discharge: HOME | End: 2023-06-25
Payer: COMMERCIAL

## 2023-06-22 PROCEDURE — 97113 AQUATIC THERAPY/EXERCISES: CPT

## 2023-06-22 PROCEDURE — 97530 THERAPEUTIC ACTIVITIES: CPT

## 2023-06-27 ENCOUNTER — HOSPITAL ENCOUNTER (OUTPATIENT)
Facility: HOSPITAL | Age: 58
Setting detail: RECURRING SERIES
Discharge: HOME | End: 2023-06-30
Payer: COMMERCIAL

## 2023-06-27 PROCEDURE — 97113 AQUATIC THERAPY/EXERCISES: CPT

## 2023-06-29 ENCOUNTER — APPOINTMENT (OUTPATIENT)
Facility: HOSPITAL | Age: 58
End: 2023-06-29
Payer: COMMERCIAL

## 2023-07-06 ENCOUNTER — HOSPITAL ENCOUNTER (OUTPATIENT)
Facility: HOSPITAL | Age: 58
Setting detail: RECURRING SERIES
Discharge: HOME | End: 2023-07-09
Payer: COMMERCIAL

## 2023-07-06 PROCEDURE — 97113 AQUATIC THERAPY/EXERCISES: CPT

## 2023-07-13 ENCOUNTER — APPOINTMENT (OUTPATIENT)
Facility: HOSPITAL | Age: 58
End: 2023-07-13
Payer: COMMERCIAL

## 2023-07-18 ENCOUNTER — APPOINTMENT (OUTPATIENT)
Facility: HOSPITAL | Age: 58
End: 2023-07-18
Payer: COMMERCIAL

## 2023-07-19 ENCOUNTER — HOSPITAL ENCOUNTER (OUTPATIENT)
Facility: HOSPITAL | Age: 58
Setting detail: RECURRING SERIES
Discharge: HOME | End: 2023-07-22
Payer: COMMERCIAL

## 2023-07-19 PROCEDURE — 97110 THERAPEUTIC EXERCISES: CPT

## 2023-07-19 PROCEDURE — 97112 NEUROMUSCULAR REEDUCATION: CPT

## 2023-07-19 PROCEDURE — 97530 THERAPEUTIC ACTIVITIES: CPT

## 2023-07-19 NOTE — PROGRESS NOTES
[address] [facility] PHYSICAL THERAPY AT [facility]   [address] [address], [address]   Phone: [phone] Fax: [phone]  PROGRESS NOTE  Patient Name: Maurice Chavez : 1965   Treatment/Medical Diagnosis: Neck pain [M54.2]  Back pain, unspecified back location, unspecified back pain laterality, unspecified chronicity [M54.9]  Left cervical radiculopathy [M54.12]  Cervical facet joint syndrome [M47.812]  Left arm numbness [R20.0]  Lumbar facet arthropathy [M47.816]   Referral Source: Angel Giordano MD     Payor: Payor: Elizabeth Dave / Plan: Raven Baez / Product Type: *No Product type* /            Date of Initial Visit: 2023 Attended Visits: 7 Missed Visits: 2       CURRENT GOAL STATUS  1) Patient will improve Oswestry assessment score from 58 to 25 % in order to indicate improved functional abilities. Status at last note/certification: 05%   Current: progressing: OSW 52% (23)  2) Pt will perform 12 sit to stand repetitions in 30 seconds with no UE assist to improve ease of transfers and demo improved functional LE strength. Status at last note/certification:  10 sit to stands with no UE assistance.    Current: not met: 10 x no UE support (23)  3) Patient will report overall at least 75% improvement in low back and neck pain in order to promote improved quality of life  Status at last note/certification: 74% self-improvement  Current: not met - 20% self reported improvement (2023)  4) Patient will demonstrate >=5/5 strength in bilat hips grossly for improved ability to perform standing, ambulation, functional transfers  Status at last note/certification:   Strength LE:    Right (/5) Left (/5)   Hip     Flexion 4+ 4             Abduction (standing) 4+ 4             Adduction NT NT             Extension  (Standing) 4-4+ 3+       Current: progressing - see below (2023)    Right (/5) Left (/5)   Hip     Flexion 4+ 4+

## 2023-07-19 NOTE — PROGRESS NOTES
PHYSICAL / OCCUPATIONAL THERAPY - DAILY TREATMENT NOTE (updated )    Patient Name: Stephanie Stewart    Date: 2023    : 1965  Insurance: Payor: Beverley Babinski / Plan: Sam Francois / Product Type: *No Product type* /      Patient  verified Yes     Visit #   Current / Total 3 14   Time   In / Out 3:10 4:20   Pain   In / Out 4 0   Subjective Functional Status/Changes: I changed to land PT because I don't think I was progressing      TREATMENT AREA =  Neck pain [M54.2]  Back pain, unspecified back location, unspecified back pain laterality, unspecified chronicity [M54.9]  Left cervical radiculopathy [M54.12]  Cervical facet joint syndrome [M47.812]  Left arm numbness [R20.0]  Lumbar facet arthropathy [M47.816]    OBJECTIVE    Modalities Rationale:     decrease pain to improve patient's ability to progress to PLOF and address remaining functional goals. min [] Estim Unattended, type/location:                                      []  w/ice    []  w/heat    min [] Estim Attended, type/location:                                     []  w/US     []  w/ice    []  w/heat    []  TENS insruct      min []  Mechanical Traction: type/lbs                   []  pro   []  sup   []  int   []  cont    []  before manual    []  after manual    min []  Ultrasound, settings/location:      min []  Iontophoresis w/ dexamethasone, location:                                               []  take home patch       []  in clinic   10 min  unbill []  Ice     [x]  Heat    location/position: LB seated    min []  Paraffin,  details:     min []  Vasopneumatic Device, press/temp:     min []  Rolf Schillings / Eulas Glenn:     If using vaso (only need to measure limb vaso being performed on)      pre-treatment girth :       post-treatment girth :       measured at (landmark location) :      min []  Other:    Skin assessment post-treatment (if applicable):    []  intact    []  redness- no adverse reaction                 []redness -

## 2023-07-20 ENCOUNTER — APPOINTMENT (OUTPATIENT)
Facility: HOSPITAL | Age: 58
End: 2023-07-20
Payer: COMMERCIAL

## 2023-07-25 ENCOUNTER — APPOINTMENT (OUTPATIENT)
Facility: HOSPITAL | Age: 58
End: 2023-07-25
Payer: COMMERCIAL

## 2023-07-25 ENCOUNTER — HOSPITAL ENCOUNTER (OUTPATIENT)
Facility: HOSPITAL | Age: 58
Setting detail: RECURRING SERIES
Discharge: HOME | End: 2023-07-28
Payer: COMMERCIAL

## 2023-07-25 PROCEDURE — 97110 THERAPEUTIC EXERCISES: CPT

## 2023-07-25 PROCEDURE — 97112 NEUROMUSCULAR REEDUCATION: CPT

## 2023-07-25 PROCEDURE — 97530 THERAPEUTIC ACTIVITIES: CPT

## 2023-07-25 NOTE — PROGRESS NOTES
PHYSICAL / OCCUPATIONAL THERAPY - DAILY TREATMENT NOTE (updated )    Patient Name: Rojas Billingsley    Date: 2023    : 1965  Insurance: Payor: Carlee Todd / Plan: Radha Brand / Product Type: *No Product type* /      Patient  verified Yes     Visit #   Current / Total 1 10   Time   In / Out 3:24 4:04   Pain   In / Out 9 4   Subjective Functional Status/Changes: Pt reports having increased pain today secondary to cleaning her floors this morning. TREATMENT AREA =  Neck pain [M54.2]  Back pain, unspecified back location, unspecified back pain laterality, unspecified chronicity [M54.9]  Left cervical radiculopathy [M54.12]  Cervical facet joint syndrome [M47.812]  Left arm numbness [R20.0]  Lumbar facet arthropathy [M47.816]    OBJECTIVE    Therapeutic Procedures: Tx Min Billable or 1:1 Min (if diff from Tx Min) Procedure, Rationale, Specifics   16 16 [id number] Therapeutic Exercise (timed):  increase ROM, strength, coordination, balance, and proprioception to improve patient's ability to progress to PLOF and address remaining functional goals. (see flow sheet as applicable)     Details if applicable:       14 14 19362 Therapeutic Activity (timed):  use of dynamic activities replicating functional movements to increase ROM, strength, coordination, balance, and proprioception in order to improve patient's ability to progress to PLOF and address remaining functional goals. (see flow sheet as applicable)     Details if applicable:     10 10 77220 Neuromuscular Re-Education (timed):  improve balance, coordination, kinesthetic sense, posture, core stability and proprioception to improve patient's ability to develop conscious control of individual muscles and awareness of position of extremities in order to progress to PLOF and address remaining functional goals.  (see flow sheet as applicable)     Details if applicable:     40 36 [address] Reminder: bill using total billable min of TIMED

## 2023-07-27 ENCOUNTER — APPOINTMENT (OUTPATIENT)
Facility: HOSPITAL | Age: 58
End: 2023-07-27
Payer: COMMERCIAL

## 2023-08-01 ENCOUNTER — HOSPITAL ENCOUNTER (OUTPATIENT)
Facility: HOSPITAL | Age: 58
Setting detail: RECURRING SERIES
Discharge: HOME | End: 2023-08-04
Payer: MEDICAID

## 2023-08-01 PROCEDURE — 97112 NEUROMUSCULAR REEDUCATION: CPT

## 2023-08-01 PROCEDURE — 97110 THERAPEUTIC EXERCISES: CPT

## 2023-08-01 PROCEDURE — 97530 THERAPEUTIC ACTIVITIES: CPT

## 2023-08-01 NOTE — PROGRESS NOTES
PHYSICAL / OCCUPATIONAL THERAPY - DAILY TREATMENT NOTE (updated )    Patient Name: Med Meyesr    Date: 2023    : 1965  Insurance: Payor: Ajnice Single / Plan: Sirisha Nashs / Product Type: *No Product type* /      Patient  verified Yes     Visit #   Current / Total 2 10   Time   In / Out 2:40 3:25   Pain   In / Out 9 9   Subjective Functional Status/Changes: I haven't been sleeping good  even with my CPAP  I toss and turn all night     TREATMENT AREA =  Neck pain [M54.2]  Back pain, unspecified back location, unspecified back pain laterality, unspecified chronicity [M54.9]  Left cervical radiculopathy [M54.12]  Cervical facet joint syndrome [M47.812]  Left arm numbness [R20.0]  Lumbar facet arthropathy [M47.816]    OBJECTIVE    Therapeutic Procedures: Tx Min Billable or 1:1 Min (if diff from Tx Min) Procedure, Rationale, Specifics   15  10582 Therapeutic Exercise (timed):  increase ROM, strength, coordination, balance, and proprioception to improve patient's ability to progress to PLOF and address remaining functional goals. (see flow sheet as applicable)     Details if applicable:       20  75364 Neuromuscular Re-Education (timed):  improve balance, coordination, kinesthetic sense, posture, core stability and proprioception to improve patient's ability to develop conscious control of individual muscles and awareness of position of extremities in order to progress to PLOF and address remaining functional goals. (see flow sheet as applicable)     Details if applicable:     10  75572 Therapeutic Activity (timed):  use of dynamic activities replicating functional movements to increase ROM, strength, coordination, balance, and proprioception in order to improve patient's ability to progress to PLOF and address remaining functional goals.   (see flow sheet as applicable)     Details if applicable:            Details if applicable:            Details if applicable:     39   BC Totals

## 2023-08-03 ENCOUNTER — APPOINTMENT (OUTPATIENT)
Facility: HOSPITAL | Age: 58
End: 2023-08-03
Payer: MEDICAID

## 2023-08-08 ENCOUNTER — APPOINTMENT (OUTPATIENT)
Facility: HOSPITAL | Age: 58
End: 2023-08-08
Payer: MEDICAID

## 2023-08-10 ENCOUNTER — HOSPITAL ENCOUNTER (OUTPATIENT)
Facility: HOSPITAL | Age: 58
Setting detail: RECURRING SERIES
End: 2023-08-10
Payer: MEDICAID

## 2023-08-15 ENCOUNTER — APPOINTMENT (OUTPATIENT)
Facility: HOSPITAL | Age: 58
End: 2023-08-15
Payer: MEDICAID

## 2023-08-17 ENCOUNTER — APPOINTMENT (OUTPATIENT)
Facility: HOSPITAL | Age: 58
End: 2023-08-17
Payer: MEDICAID

## 2023-10-03 ENCOUNTER — OFFICE VISIT (OUTPATIENT)
Age: 58
End: 2023-10-03
Payer: MEDICAID

## 2023-10-03 VITALS
BODY MASS INDEX: 52.93 KG/M2 | RESPIRATION RATE: 19 BRPM | WEIGHT: 271 LBS | TEMPERATURE: 97.5 F | OXYGEN SATURATION: 97 % | HEART RATE: 74 BPM

## 2023-10-03 DIAGNOSIS — M47.816 LUMBAR FACET ARTHROPATHY: ICD-10-CM

## 2023-10-03 DIAGNOSIS — M51.36 DDD (DEGENERATIVE DISC DISEASE), LUMBAR: ICD-10-CM

## 2023-10-03 DIAGNOSIS — M54.16 LUMBAR RADICULITIS: Primary | ICD-10-CM

## 2023-10-03 PROCEDURE — 99214 OFFICE O/P EST MOD 30 MIN: CPT | Performed by: PHYSICAL MEDICINE & REHABILITATION

## 2023-10-03 RX ORDER — TRAZODONE HYDROCHLORIDE 50 MG/1
TABLET ORAL
COMMUNITY
Start: 2023-09-19

## 2023-10-03 RX ORDER — PREGABALIN 50 MG/1
CAPSULE ORAL
Qty: 90 CAPSULE | Refills: 1 | Status: SHIPPED | OUTPATIENT
Start: 2023-10-03 | End: 2023-12-04

## 2023-10-12 ENCOUNTER — HOSPITAL ENCOUNTER (OUTPATIENT)
Facility: HOSPITAL | Age: 58
Discharge: HOME | End: 2023-10-12
Attending: PHYSICAL MEDICINE & REHABILITATION
Payer: MEDICAID

## 2023-10-12 DIAGNOSIS — M47.816 LUMBAR FACET ARTHROPATHY: ICD-10-CM

## 2023-10-12 DIAGNOSIS — M51.36 DDD (DEGENERATIVE DISC DISEASE), LUMBAR: ICD-10-CM

## 2023-10-12 DIAGNOSIS — M54.16 LUMBAR RADICULITIS: ICD-10-CM

## 2023-10-12 PROCEDURE — 72148 MRI LUMBAR SPINE W/O DYE: CPT

## 2023-12-04 NOTE — PROGRESS NOTES
1 tablet by mouth daily, Disp: , Rfl:     atorvastatin (LIPITOR) 40 MG tablet, TAKE 1 TABLET BY MOUTH ONCE DAILY FOR 90 DAYS, Disp: , Rfl:     ergocalciferol (ERGOCALCIFEROL) 1.25 MG (40693 UT) capsule, TAKE 1 CAPSULE BY MOUTH THREE TIMES A WEEK, Disp: , Rfl:     fluocinonide (LIDEX) 0.05 % cream, ceived the following from [facility]: Outside name: fluocinoNIDE (LIDEX) 0.05 % topical cream, Disp: , Rfl:     hydroCHLOROthiazide (HYDRODIURIL) 12.5 MG tablet, Take 1 tablet by mouth daily, Disp: , Rfl:     ibuprofen (ADVIL;MOTRIN) 800 MG tablet, TAKE 1 TABLET BY MOUTH THREE TIMES DAILY WITH FOOD OR MILK; TAKE AFTER COMPLETION OF STEROID., Disp: , Rfl:     lidocaine 4 % external patch, APPLY TOPICALLY AS DIRECTED ONCE DAILY. WEAR FOR 12 HOURS AND REMAIN PATCH FREE FOR 12 HOURS, Disp: , Rfl:     losartan (COZAAR) 100 MG tablet, TAKE 1 TABLET BY MOUTH ONCE DAILY FOR 90 DAYS, Disp: , Rfl:     tolnaftate (TINACTIN) 1 % cream, ceived the following from [facility]: Outside name: tolnaftate (TINACTIN) 1 % topical cream, Disp: , Rfl:     pregabalin (LYRICA) 50 MG capsule, Take 1 in the morning and 2 in the evening for neuropathic symptoms as directed, Disp: 90 capsule, Rfl: 1    pregabalin (LYRICA) 50 MG capsule, Take 1 cap by mouth in the morning and 2 at night as directed., Disp: 90 capsule, Rfl: 1    losartan-hydroCHLOROthiazide (HYZAAR) 50-12.5 MG per tablet, , Disp: , Rfl:     meloxicam (MOBIC) 15 MG tablet, Take 1 tablet by mouth daily, Disp: , Rfl:     methylPREDNISolone (MEDROL DOSEPACK) 4 MG tablet, , Disp: , Rfl:      Allergies   Allergen Reactions    Tramadol Nausea And Vomiting     Other reaction(s): gi distress  N&V         REVIEW OF SYSTEMS    Constitutional: Negative for fever, chills, or weight change. Respiratory: Negative for cough or shortness of breath. Cardiovascular: Negative for chest pain or palpitations.   Gastrointestinal: Negative for acid reflux, change in bowel

## 2023-12-05 ENCOUNTER — OFFICE VISIT (OUTPATIENT)
Age: 58
End: 2023-12-05
Payer: MEDICAID

## 2023-12-05 VITALS
BODY MASS INDEX: 52.61 KG/M2 | WEIGHT: 268 LBS | DIASTOLIC BLOOD PRESSURE: 69 MMHG | TEMPERATURE: 97.2 F | SYSTOLIC BLOOD PRESSURE: 113 MMHG | HEIGHT: 60 IN | OXYGEN SATURATION: 96 % | HEART RATE: 65 BPM

## 2023-12-05 DIAGNOSIS — M47.816 LUMBAR FACET ARTHROPATHY: ICD-10-CM

## 2023-12-05 DIAGNOSIS — M48.062 SPINAL STENOSIS OF LUMBAR REGION WITH NEUROGENIC CLAUDICATION: ICD-10-CM

## 2023-12-05 DIAGNOSIS — M54.16 LUMBAR RADICULITIS: Primary | ICD-10-CM

## 2023-12-05 DIAGNOSIS — M62.838 MUSCLE SPASM: ICD-10-CM

## 2023-12-05 PROCEDURE — 99214 OFFICE O/P EST MOD 30 MIN: CPT | Performed by: PHYSICAL MEDICINE & REHABILITATION

## 2023-12-05 PROCEDURE — 3078F DIAST BP <80 MM HG: CPT | Performed by: PHYSICAL MEDICINE & REHABILITATION

## 2023-12-05 PROCEDURE — 3074F SYST BP LT 130 MM HG: CPT | Performed by: PHYSICAL MEDICINE & REHABILITATION

## 2023-12-05 RX ORDER — PREGABALIN 75 MG/1
CAPSULE ORAL
Qty: 90 CAPSULE | Refills: 1 | Status: SHIPPED | OUTPATIENT
Start: 2023-12-05 | End: 2024-02-05

## 2023-12-05 RX ORDER — DOXEPIN HYDROCHLORIDE 10 MG/1
CAPSULE ORAL
COMMUNITY
Start: 2023-09-26

## 2024-02-06 ENCOUNTER — TELEMEDICINE (OUTPATIENT)
Age: 59
End: 2024-02-06
Payer: MEDICAID

## 2024-02-06 DIAGNOSIS — M54.16 LUMBAR RADICULITIS: ICD-10-CM

## 2024-02-06 DIAGNOSIS — M48.062 SPINAL STENOSIS OF LUMBAR REGION WITH NEUROGENIC CLAUDICATION: Primary | ICD-10-CM

## 2024-02-06 DIAGNOSIS — M47.816 LUMBAR FACET ARTHROPATHY: ICD-10-CM

## 2024-02-06 PROCEDURE — 99441 PR PHYS/QHP TELEPHONE EVALUATION 5-10 MIN: CPT | Performed by: PHYSICAL MEDICINE & REHABILITATION

## 2024-02-06 RX ORDER — PREGABALIN 75 MG/1
CAPSULE ORAL
Qty: 90 CAPSULE | Refills: 1 | Status: SHIPPED | OUTPATIENT
Start: 2024-02-06 | End: 2024-04-08

## 2024-02-06 NOTE — PROGRESS NOTES
TELEPHONE VISIT    Cordelia Sandoval, was evaluated through a synchronous (real-time) audio-video encounter. The patient (or guardian if applicable) is aware that this is a billable service, which includes applicable co-pays. This Virtual Visit was conducted with patient's (and/or legal guardian's) consent. Patient identification was verified, and a caregiver was present when appropriate.   The patient was located at Home: [address]  [address]  [place] [address]  Provider was located at Facility (Appt Dept): [address] [facility]  [address]  [place] [address]    Cordelia Sandoval (:  1965) is a Established patient, presenting virtually for evaluation of the following:    Assessment & Plan   Below is the assessment and plan developed based on review of pertinent history, physical exam, labs, studies, and medications.  1. Spinal stenosis of lumbar region with neurogenic claudication  -     Ambulatory Referral to Ortho Injection  2. Lumbar radiculitis  -     pregabalin (LYRICA) 75 MG capsule; Take 1 in the morning and 2 in the evening for neuropathic symptoms, Disp-90 capsule, R-1Normal  -     Ambulatory Referral to Ortho Injection  3. Lumbar facet arthropathy    Return in about 13 days (around 2024) for follow up.       Subjective   HPI    Cordelia Sandoval is a 58 y.o. RHD female with history of cervical and left arm pain and presents to the office today for medication follow up.   At last OV, Pt increased Lyrica 50 MG and she was referred to orthopedics for a L3/4 lumbar epidural for better pain management.      Patient presents today with   Pt states she did not get a RF of her Lyrica so she is no longer taking it. She is getting her lumbar epidural injection done this Thursday.      Per last office note, Pt continues to complain of lumbar pain, pain in the cervical region with radicular sypmtoms into the left arm towards the digits, numbness/tingling in the left arm, with a new onset of

## 2024-02-08 ENCOUNTER — HOSPITAL ENCOUNTER (OUTPATIENT)
Facility: HOSPITAL | Age: 59
Discharge: HOME | End: 2024-02-11

## 2024-02-19 ENCOUNTER — OFFICE VISIT (OUTPATIENT)
Age: 59
End: 2024-02-19
Payer: MEDICAID

## 2024-02-19 VITALS
DIASTOLIC BLOOD PRESSURE: 71 MMHG | WEIGHT: 261 LBS | BODY MASS INDEX: 51.24 KG/M2 | HEART RATE: 73 BPM | OXYGEN SATURATION: 97 % | SYSTOLIC BLOOD PRESSURE: 130 MMHG | HEIGHT: 60 IN

## 2024-02-19 DIAGNOSIS — M62.838 MUSCLE SPASM: ICD-10-CM

## 2024-02-19 DIAGNOSIS — M48.062 SPINAL STENOSIS OF LUMBAR REGION WITH NEUROGENIC CLAUDICATION: ICD-10-CM

## 2024-02-19 DIAGNOSIS — K59.03 DRUG-INDUCED CONSTIPATION: ICD-10-CM

## 2024-02-19 DIAGNOSIS — M47.816 LUMBAR FACET ARTHROPATHY: ICD-10-CM

## 2024-02-19 DIAGNOSIS — M54.16 LUMBAR RADICULITIS: Primary | ICD-10-CM

## 2024-02-19 PROCEDURE — 3078F DIAST BP <80 MM HG: CPT | Performed by: PHYSICAL MEDICINE & REHABILITATION

## 2024-02-19 PROCEDURE — 99213 OFFICE O/P EST LOW 20 MIN: CPT | Performed by: PHYSICAL MEDICINE & REHABILITATION

## 2024-02-19 PROCEDURE — 3075F SYST BP GE 130 - 139MM HG: CPT | Performed by: PHYSICAL MEDICINE & REHABILITATION

## 2024-02-19 NOTE — PATIENT INSTRUCTIONS
good idea to know your test results and keep a list of the medicines you take.  Current as of: March 9, 2022               Content Version: 13.5  © 2006-2022 [employer].   Care instructions adapted under license by [employer]. If you have questions about a medical condition or this instruction, always ask your healthcare professional. [employer] disclaims any warranty or liability for your use of this information.      Low Back Arthritis: Exercises  Introduction  Here are some examples of typical rehabilitation exercises for your condition. Start each exercise slowly. Ease off the exercise if you start to have pain.  Your doctor or physical therapist will tell you when you can start these exercises and which ones will work best for you.  When you are not being active, find a comfortable position for rest. Some people are comfortable on the floor or a medium-firm bed with a small pillow under their head and another under their knees. Some people prefer to lie on their side with a pillow between their knees. Don't stay in one position for too long.  Take short walks (10 to 20 minutes) every 2 to 3 hours. Avoid slopes, hills, and stairs until you feel better. Walk only distances you can manage without pain, especially leg pain.  How to do the exercises  Pelvic tilt  Lie on your back with your knees bent.  \"Brace\" your stomach--tighten your muscles by pulling in and imagining your belly button moving toward your spine.  Press your lower back into the floor. You should feel your hips and pelvis rock back.  Hold for 6 seconds while breathing smoothly.  Relax and allow your pelvis and hips to rock forward.  Repeat 8 to 12 times.  Back stretches  Get down on your hands and knees on the floor.  Relax your head and allow it to droop. Round your back up toward the ceiling until you feel a nice stretch in your upper, middle, and lower back. Hold this stretch for as long as it feels comfortable, or

## 2024-02-19 NOTE — PROGRESS NOTES
[facility]  [address] [place] [address]., [address]  [place] [address]  Phone: [phone]  Fax: [phone]    Pt's YOB: 1965    ASSESSMENT   Cordelia was seen today for lower back pain.    Diagnoses and all orders for this visit:    Lumbar radiculitis    Spinal stenosis of lumbar region with neurogenic claudication    Lumbar facet arthropathy    Muscle spasm    Drug-induced constipation         IMPRESSION AND PLAN:  Cordelia Sandoval is a 58 y.o. RHD female with history of cervical and left arm pain and presents to the office today for diagnostic and medication follow up. Pt continues to complain of lumbar pain, pain in the cervical region with radicular sypmtoms into the left arm towards the digits, numbness/tingling in the left arm, with a new onset of radicular symptoms down both legs (L>R). Pt reports undergoing physical therapy with minimal improvement. Pt desires to undergo a L3/4 lumbar epidural for better pain management. She will increase Lyrica 50 MG 1 cap QAM and 2 caps QHS to 75 MG 1 cap QAM and 2 caps QHS for better management of neuropathic symptoms.     Pt was given information on herniated disc exercises and constipation  Ms. Sandoval has a reminder for a \"due or due soon\" health maintenance. I have asked that she contact her primary care provider, Elver Herrera APRN - NP, for follow-up on this health maintenance.   demonstrated consistency with prescribing.  Lyrica 75 mg  for neuropathic was refilled-- pt may adjust the dose to bid as symptoms allow  Return in about 6 weeks (around 4/1/2024) for Medication follow up.        HISTORY OF PRESENT ILLNESS:  Cordelia Sandoval is a 58 y.o. RHD female with history of lumbar facet arthropathy, lumbar stenosis, and lumbar radiculitis and presents to the office today for follow up. Pt continues to complain of lumbar pain, cervical pain with radicular sypmtoms into the left arm towards the digits,

## 2024-02-26 ENCOUNTER — HOSPITAL ENCOUNTER (OUTPATIENT)
Facility: HOSPITAL | Age: 59
Setting detail: RECURRING SERIES
Discharge: HOME | End: 2024-02-29
Payer: MEDICAID

## 2024-02-26 PROCEDURE — 99214 OFFICE O/P EST MOD 30 MIN: CPT

## 2024-02-26 PROCEDURE — 99205 OFFICE O/P NEW HI 60 MIN: CPT | Performed by: RADIOLOGY

## 2024-03-05 ENCOUNTER — OFFICE VISIT (OUTPATIENT)
Age: 59
End: 2024-03-05
Payer: MEDICAID

## 2024-03-05 VITALS
WEIGHT: 266 LBS | HEIGHT: 60 IN | HEART RATE: 68 BPM | RESPIRATION RATE: 18 BRPM | BODY MASS INDEX: 52.22 KG/M2 | OXYGEN SATURATION: 100 %

## 2024-03-05 DIAGNOSIS — M54.12 LEFT CERVICAL RADICULOPATHY: Primary | ICD-10-CM

## 2024-03-05 PROCEDURE — 99214 OFFICE O/P EST MOD 30 MIN: CPT | Performed by: NURSE PRACTITIONER

## 2024-03-05 RX ORDER — METHYLPREDNISOLONE 4 MG/1
TABLET ORAL
Qty: 1 KIT | Refills: 0 | Status: SHIPPED | OUTPATIENT
Start: 2024-03-05 | End: 2024-03-11

## 2024-03-05 NOTE — PROGRESS NOTES
Chief complaint   Chief Complaint   Patient presents with    Back Problem    Pain    Back Pain       History of Present Illness:  Cordelia Sandoval is a  58 y.o.  female who comes in today after last being seen by Dr. Chacon on February 19, 2024.  At that time her Lyrica was increased to 75 mg in the morning and 50 mg at night.  She states it makes her constipated and sleepy and she is no longer taking it.  She is complaining today of increased left radicular pain.  Radiates all the way down to her fingers.  She denies any injury or excessive activity that would have initiated or exacerbated her pain.  She denies fever bowel bladder dysfunction.    Physical Exam: The patient is a 58-year-old female well-developed well-nourished who is alert and oriented with a normal mood and affect.  She has a full weightbearing nonantalgic gait.  She has 5 out of 5 strength bilateral upper extremities.  Negative Fiorella's.  She has normal tandem gait.  She did not use an assistive device.    Assessment and Plan: This is a patient who has cervical radiculopathy.  She is having a flare of arm pain.  I will give her a Medrol Dosepak to take.  She has tolerated this in the past.  She knows to take it with food not to take anti-inflammatories with it.  She states she has having a lumpectomy on the right for breast cancer on Friday.  She is going to hold this Medrol Dosepak until she speaks with that surgeon to make sure she can take it or if he wants her to wait until after the surgery.  She is scheduled she has a follow-up appointment with Dr. Chacon coming up in a few weeks.    Cordelia was seen today for back problem, pain and back pain.    Diagnoses and all orders for this visit:    Left cervical radiculopathy  -     methylPREDNISolone (MEDROL DOSEPACK) 4 MG tablet; Take by mouth.        No follow-ups on file.      has been .reviewed and is appropriate    Medications:  Current Outpatient Medications   Medication Sig Dispense

## 2024-03-05 NOTE — PROGRESS NOTES
Cordelia ANDERSON Elsavega presents today for   Chief Complaint   Patient presents with    Back Problem    Pain    Back Pain       Is someone accompanying this pt? no    Is the patient using any DME equipment during OV? no    Depression Screening:       No data to display                Learning Assessment:  Failed to redirect to the Timeline version of the [employer] SmartLink.    Abuse Screening:       No data to display                Fall Risk  Failed to redirect to the Timeline version of the [employer] Smart[employer].    OPIOID RISK TOOL  Failed to redirect to the Timeline version of the [employer] Smart[employer].    Coordination of Care:  1. Have you been to the ER, urgent care clinic since your last visit? Yes cyst on back  Hospitalized since your last visit? no    2. Have you seen or consulted any other health care providers outside of the [facility] since your last visit? no Include any pap smears or colon screening. no

## 2024-04-22 ENCOUNTER — HOSPITAL ENCOUNTER (OUTPATIENT)
Facility: HOSPITAL | Age: 59
Setting detail: RECURRING SERIES
Discharge: HOME | End: 2024-04-25
Payer: MEDICAID

## 2024-04-22 PROCEDURE — 99214 OFFICE O/P EST MOD 30 MIN: CPT

## 2024-04-24 ENCOUNTER — APPOINTMENT (OUTPATIENT)
Facility: HOSPITAL | Age: 59
End: 2024-04-24
Attending: RADIOLOGY
Payer: MEDICAID

## 2024-05-06 ENCOUNTER — OFFICE VISIT (OUTPATIENT)
Age: 59
End: 2024-05-06
Payer: MEDICAID

## 2024-05-06 DIAGNOSIS — C50.911 MALIGNANT NEOPLASM OF RIGHT FEMALE BREAST, UNSPECIFIED ESTROGEN RECEPTOR STATUS, UNSPECIFIED SITE OF BREAST (HCC): ICD-10-CM

## 2024-05-06 DIAGNOSIS — M54.12 LEFT CERVICAL RADICULOPATHY: Primary | ICD-10-CM

## 2024-05-06 DIAGNOSIS — M47.816 LUMBAR FACET ARTHROPATHY: ICD-10-CM

## 2024-05-06 DIAGNOSIS — K59.00 CONSTIPATION, UNSPECIFIED CONSTIPATION TYPE: ICD-10-CM

## 2024-05-06 DIAGNOSIS — G56.02 CARPAL TUNNEL SYNDROME OF LEFT WRIST: ICD-10-CM

## 2024-05-06 PROCEDURE — 99213 OFFICE O/P EST LOW 20 MIN: CPT | Performed by: PHYSICAL MEDICINE & REHABILITATION

## 2024-05-06 NOTE — PROGRESS NOTES
[facility]  [address] [place] [address]., [address]  [place] [address]  Phone: [phone]  Fax: [phone]     Pt's YOB: 1965    ASSESSMENT   Cordelia was seen today for arm pain.    Diagnoses and all orders for this visit:    Left cervical radiculopathy    Lumbar facet arthropathy    Constipation, unspecified constipation type    Carpal tunnel syndrome of left wrist    Malignant neoplasm of right female breast, unspecified estrogen receptor status, unspecified site of breast (HCC)         IMPRESSION AND PLAN:  Cordelia Sandoval is a 58 y.o. female with history of pain in the lumbar region and cervical region and presents to the office today for medication follow up. She is taking Lyrica 75 mg 1 cap QAM and 2 caps QHS. She has previously taken Neurontin with adverse side effects of dizziness and is allergic to Ultram. Pt has a hx of multiple TIA's.     Ms. Sandoval has a reminder for a \"due or due soon\" health maintenance. I have asked that she contact her primary care provider, Elver Herrera APRN - NP, for follow-up on this health maintenance.   demonstrated consistency with prescribing.   Pt was advised to increase fiber intake and was given information  Patient was given information about constipation   Patient does not need medication refills at this time.   Patient was advised to continue with current care.   Pt will continue with Lyrica 75 for neuropathic symptoms and still has 1 refill    Return in about 4 months (around 9/6/2024) for Medication follow up.        HISTORY OF PRESENT ILLNESS:  Cordelia Sandoval is a 58 y.o. RHD female with history of lumbar facet arthropathy, lumbar spinal stenosis, lumbar radiculitis, and muscle spasms and presents to the office today for medication follow up. At last OV, pt was given information on herniated disc exercises and constipation and Lyrica 75 mg was refilled.    Patient presents today continuing to

## 2024-05-08 ENCOUNTER — HOSPITAL ENCOUNTER (OUTPATIENT)
Facility: HOSPITAL | Age: 59
Setting detail: RECURRING SERIES
Discharge: HOME | End: 2024-05-11
Attending: RADIOLOGY
Payer: MEDICAID

## 2024-05-08 PROCEDURE — 77263 THER RADIOLOGY TX PLNG CPLX: CPT | Performed by: RADIOLOGY

## 2024-05-08 PROCEDURE — 77332 RADIATION TREATMENT AID(S): CPT

## 2024-05-10 ENCOUNTER — TELEPHONE (OUTPATIENT)
Age: 59
End: 2024-05-10

## 2024-05-10 RX ORDER — LIDOCAINE 4 G/G
PATCH TOPICAL
Qty: 30 PATCH | Refills: 1 | Status: SHIPPED | OUTPATIENT
Start: 2024-05-10

## 2024-05-10 NOTE — TELEPHONE ENCOUNTER
Patient requests prescription for   lidocaine 4 % external patch called in to [facility] on [address].  Last prescribed in 2022, however, patient says she forgot to request them at her appt the other day.

## 2024-05-13 ENCOUNTER — HOSPITAL ENCOUNTER (OUTPATIENT)
Facility: HOSPITAL | Age: 59
Setting detail: RECURRING SERIES
Discharge: HOME | End: 2024-05-16
Attending: RADIOLOGY
Payer: MEDICAID

## 2024-05-13 PROCEDURE — 77295 3-D RADIOTHERAPY PLAN: CPT

## 2024-05-13 PROCEDURE — 77300 RADIATION THERAPY DOSE PLAN: CPT

## 2024-05-13 PROCEDURE — 77334 RADIATION TREATMENT AID(S): CPT

## 2024-05-16 ENCOUNTER — HOSPITAL ENCOUNTER (OUTPATIENT)
Facility: HOSPITAL | Age: 59
Setting detail: RECURRING SERIES
Discharge: HOME | End: 2024-05-19
Attending: RADIOLOGY
Payer: MEDICAID

## 2024-05-16 LAB
RAD ONC ARIA COURSE FIRST TREATMENT DATE: NORMAL
RAD ONC ARIA COURSE ID: NORMAL
RAD ONC ARIA COURSE INTENT: NORMAL
RAD ONC ARIA COURSE LAST TREATMENT DATE: NORMAL
RAD ONC ARIA COURSE SESSION NUMBER: 1
RAD ONC ARIA COURSE START DATE: NORMAL
RAD ONC ARIA COURSE TREATMENT ELAPSED DAYS: 0
RAD ONC ARIA PLAN FRACTIONS TREATED TO DATE: 1
RAD ONC ARIA PLAN ID: NORMAL
RAD ONC ARIA PLAN PRESCRIBED DOSE PER FRACTION: 2 GY
RAD ONC ARIA PLAN PRIMARY REFERENCE POINT: NORMAL
RAD ONC ARIA PLAN TOTAL FRACTIONS PRESCRIBED: 25
RAD ONC ARIA PLAN TOTAL PRESCRIBED DOSE: 5000 CGY
RAD ONC ARIA REFERENCE POINT DOSAGE GIVEN TO DATE: 2 GY
RAD ONC ARIA REFERENCE POINT ID: NORMAL
RAD ONC ARIA REFERENCE POINT SESSION DOSAGE GIVEN: 2 GY

## 2024-05-16 PROCEDURE — 77427 RADIATION TX MANAGEMENT X5: CPT | Performed by: RADIOLOGY

## 2024-05-16 PROCEDURE — 77280 THER RAD SIMULAJ FIELD SMPL: CPT

## 2024-05-16 PROCEDURE — 77412 RADIATION TX DELIVERY LVL 3: CPT

## 2024-05-17 ENCOUNTER — HOSPITAL ENCOUNTER (OUTPATIENT)
Facility: HOSPITAL | Age: 59
Setting detail: RECURRING SERIES
Discharge: HOME | End: 2024-05-20
Attending: RADIOLOGY
Payer: MEDICAID

## 2024-05-17 LAB
RAD ONC ARIA COURSE FIRST TREATMENT DATE: NORMAL
RAD ONC ARIA COURSE ID: NORMAL
RAD ONC ARIA COURSE INTENT: NORMAL
RAD ONC ARIA COURSE LAST TREATMENT DATE: NORMAL
RAD ONC ARIA COURSE SESSION NUMBER: 2
RAD ONC ARIA COURSE START DATE: NORMAL
RAD ONC ARIA COURSE TREATMENT ELAPSED DAYS: 1
RAD ONC ARIA PLAN FRACTIONS TREATED TO DATE: 2
RAD ONC ARIA PLAN ID: NORMAL
RAD ONC ARIA PLAN PRESCRIBED DOSE PER FRACTION: 2 GY
RAD ONC ARIA PLAN PRIMARY REFERENCE POINT: NORMAL
RAD ONC ARIA PLAN TOTAL FRACTIONS PRESCRIBED: 25
RAD ONC ARIA PLAN TOTAL PRESCRIBED DOSE: 5000 CGY
RAD ONC ARIA REFERENCE POINT DOSAGE GIVEN TO DATE: 4 GY
RAD ONC ARIA REFERENCE POINT ID: NORMAL
RAD ONC ARIA REFERENCE POINT SESSION DOSAGE GIVEN: 2 GY

## 2024-05-17 PROCEDURE — 77412 RADIATION TX DELIVERY LVL 3: CPT

## 2024-05-20 ENCOUNTER — HOSPITAL ENCOUNTER (OUTPATIENT)
Facility: HOSPITAL | Age: 59
Setting detail: RECURRING SERIES
Discharge: HOME | End: 2024-05-23
Attending: RADIOLOGY
Payer: MEDICAID

## 2024-05-20 LAB
RAD ONC ARIA COURSE FIRST TREATMENT DATE: NORMAL
RAD ONC ARIA COURSE ID: NORMAL
RAD ONC ARIA COURSE INTENT: NORMAL
RAD ONC ARIA COURSE LAST TREATMENT DATE: NORMAL
RAD ONC ARIA COURSE SESSION NUMBER: 3
RAD ONC ARIA COURSE START DATE: NORMAL
RAD ONC ARIA COURSE TREATMENT ELAPSED DAYS: 4
RAD ONC ARIA PLAN FRACTIONS TREATED TO DATE: 3
RAD ONC ARIA PLAN ID: NORMAL
RAD ONC ARIA PLAN PRESCRIBED DOSE PER FRACTION: 2 GY
RAD ONC ARIA PLAN PRIMARY REFERENCE POINT: NORMAL
RAD ONC ARIA PLAN TOTAL FRACTIONS PRESCRIBED: 25
RAD ONC ARIA PLAN TOTAL PRESCRIBED DOSE: 5000 CGY
RAD ONC ARIA REFERENCE POINT DOSAGE GIVEN TO DATE: 6 GY
RAD ONC ARIA REFERENCE POINT ID: NORMAL
RAD ONC ARIA REFERENCE POINT SESSION DOSAGE GIVEN: 2 GY

## 2024-05-20 PROCEDURE — 77412 RADIATION TX DELIVERY LVL 3: CPT

## 2024-05-21 ENCOUNTER — HOSPITAL ENCOUNTER (OUTPATIENT)
Facility: HOSPITAL | Age: 59
Setting detail: RECURRING SERIES
Discharge: HOME | End: 2024-05-24
Attending: RADIOLOGY
Payer: MEDICAID

## 2024-05-21 LAB
RAD ONC ARIA COURSE FIRST TREATMENT DATE: NORMAL
RAD ONC ARIA COURSE ID: NORMAL
RAD ONC ARIA COURSE INTENT: NORMAL
RAD ONC ARIA COURSE LAST TREATMENT DATE: NORMAL
RAD ONC ARIA COURSE SESSION NUMBER: 4
RAD ONC ARIA COURSE START DATE: NORMAL
RAD ONC ARIA COURSE TREATMENT ELAPSED DAYS: 5
RAD ONC ARIA PLAN FRACTIONS TREATED TO DATE: 4
RAD ONC ARIA PLAN ID: NORMAL
RAD ONC ARIA PLAN PRESCRIBED DOSE PER FRACTION: 2 GY
RAD ONC ARIA PLAN PRIMARY REFERENCE POINT: NORMAL
RAD ONC ARIA PLAN TOTAL FRACTIONS PRESCRIBED: 25
RAD ONC ARIA PLAN TOTAL PRESCRIBED DOSE: 5000 CGY
RAD ONC ARIA REFERENCE POINT DOSAGE GIVEN TO DATE: 8 GY
RAD ONC ARIA REFERENCE POINT ID: NORMAL
RAD ONC ARIA REFERENCE POINT SESSION DOSAGE GIVEN: 2 GY

## 2024-05-21 PROCEDURE — 77412 RADIATION TX DELIVERY LVL 3: CPT

## 2024-05-22 ENCOUNTER — HOSPITAL ENCOUNTER (OUTPATIENT)
Facility: HOSPITAL | Age: 59
Setting detail: RECURRING SERIES
Discharge: HOME | End: 2024-05-25
Attending: RADIOLOGY
Payer: MEDICAID

## 2024-05-22 LAB
RAD ONC ARIA COURSE FIRST TREATMENT DATE: NORMAL
RAD ONC ARIA COURSE ID: NORMAL
RAD ONC ARIA COURSE INTENT: NORMAL
RAD ONC ARIA COURSE LAST TREATMENT DATE: NORMAL
RAD ONC ARIA COURSE SESSION NUMBER: 5
RAD ONC ARIA COURSE START DATE: NORMAL
RAD ONC ARIA COURSE TREATMENT ELAPSED DAYS: 6
RAD ONC ARIA PLAN FRACTIONS TREATED TO DATE: 5
RAD ONC ARIA PLAN ID: NORMAL
RAD ONC ARIA PLAN PRESCRIBED DOSE PER FRACTION: 2 GY
RAD ONC ARIA PLAN PRIMARY REFERENCE POINT: NORMAL
RAD ONC ARIA PLAN TOTAL FRACTIONS PRESCRIBED: 25
RAD ONC ARIA PLAN TOTAL PRESCRIBED DOSE: 5000 CGY
RAD ONC ARIA REFERENCE POINT DOSAGE GIVEN TO DATE: 10 GY
RAD ONC ARIA REFERENCE POINT ID: NORMAL
RAD ONC ARIA REFERENCE POINT SESSION DOSAGE GIVEN: 2 GY

## 2024-05-22 PROCEDURE — 77412 RADIATION TX DELIVERY LVL 3: CPT

## 2024-05-22 PROCEDURE — 77336 RADIATION PHYSICS CONSULT: CPT

## 2024-05-23 ENCOUNTER — HOSPITAL ENCOUNTER (OUTPATIENT)
Facility: HOSPITAL | Age: 59
Setting detail: RECURRING SERIES
Discharge: HOME | End: 2024-05-26
Attending: RADIOLOGY
Payer: MEDICAID

## 2024-05-23 LAB
RAD ONC ARIA COURSE FIRST TREATMENT DATE: NORMAL
RAD ONC ARIA COURSE ID: NORMAL
RAD ONC ARIA COURSE INTENT: NORMAL
RAD ONC ARIA COURSE LAST TREATMENT DATE: NORMAL
RAD ONC ARIA COURSE SESSION NUMBER: 6
RAD ONC ARIA COURSE START DATE: NORMAL
RAD ONC ARIA COURSE TREATMENT ELAPSED DAYS: 7
RAD ONC ARIA PLAN FRACTIONS TREATED TO DATE: 6
RAD ONC ARIA PLAN ID: NORMAL
RAD ONC ARIA PLAN PRESCRIBED DOSE PER FRACTION: 2 GY
RAD ONC ARIA PLAN PRIMARY REFERENCE POINT: NORMAL
RAD ONC ARIA PLAN TOTAL FRACTIONS PRESCRIBED: 25
RAD ONC ARIA PLAN TOTAL PRESCRIBED DOSE: 5000 CGY
RAD ONC ARIA REFERENCE POINT DOSAGE GIVEN TO DATE: 12 GY
RAD ONC ARIA REFERENCE POINT ID: NORMAL
RAD ONC ARIA REFERENCE POINT SESSION DOSAGE GIVEN: 2 GY

## 2024-05-23 PROCEDURE — 77417 THER RADIOLOGY PORT IMAGE(S): CPT

## 2024-05-23 PROCEDURE — 77412 RADIATION TX DELIVERY LVL 3: CPT

## 2024-05-23 PROCEDURE — 77427 RADIATION TX MANAGEMENT X5: CPT | Performed by: RADIOLOGY

## 2024-05-24 ENCOUNTER — HOSPITAL ENCOUNTER (OUTPATIENT)
Facility: HOSPITAL | Age: 59
Setting detail: RECURRING SERIES
Discharge: HOME | End: 2024-05-27
Attending: RADIOLOGY
Payer: MEDICAID

## 2024-05-24 LAB
RAD ONC ARIA COURSE FIRST TREATMENT DATE: NORMAL
RAD ONC ARIA COURSE ID: NORMAL
RAD ONC ARIA COURSE INTENT: NORMAL
RAD ONC ARIA COURSE LAST TREATMENT DATE: NORMAL
RAD ONC ARIA COURSE SESSION NUMBER: 7
RAD ONC ARIA COURSE START DATE: NORMAL
RAD ONC ARIA COURSE TREATMENT ELAPSED DAYS: 8
RAD ONC ARIA PLAN FRACTIONS TREATED TO DATE: 7
RAD ONC ARIA PLAN ID: NORMAL
RAD ONC ARIA PLAN PRESCRIBED DOSE PER FRACTION: 2 GY
RAD ONC ARIA PLAN PRIMARY REFERENCE POINT: NORMAL
RAD ONC ARIA PLAN TOTAL FRACTIONS PRESCRIBED: 25
RAD ONC ARIA PLAN TOTAL PRESCRIBED DOSE: 5000 CGY
RAD ONC ARIA REFERENCE POINT DOSAGE GIVEN TO DATE: 14 GY
RAD ONC ARIA REFERENCE POINT ID: NORMAL
RAD ONC ARIA REFERENCE POINT SESSION DOSAGE GIVEN: 2 GY

## 2024-05-24 PROCEDURE — 77412 RADIATION TX DELIVERY LVL 3: CPT

## 2024-05-28 ENCOUNTER — HOSPITAL ENCOUNTER (OUTPATIENT)
Facility: HOSPITAL | Age: 59
Setting detail: RECURRING SERIES
Discharge: HOME | End: 2024-05-31
Attending: RADIOLOGY
Payer: MEDICAID

## 2024-05-28 LAB
RAD ONC ARIA COURSE FIRST TREATMENT DATE: NORMAL
RAD ONC ARIA COURSE ID: NORMAL
RAD ONC ARIA COURSE INTENT: NORMAL
RAD ONC ARIA COURSE LAST TREATMENT DATE: NORMAL
RAD ONC ARIA COURSE SESSION NUMBER: 8
RAD ONC ARIA COURSE START DATE: NORMAL
RAD ONC ARIA COURSE TREATMENT ELAPSED DAYS: 12
RAD ONC ARIA PLAN FRACTIONS TREATED TO DATE: 8
RAD ONC ARIA PLAN ID: NORMAL
RAD ONC ARIA PLAN PRESCRIBED DOSE PER FRACTION: 2 GY
RAD ONC ARIA PLAN PRIMARY REFERENCE POINT: NORMAL
RAD ONC ARIA PLAN TOTAL FRACTIONS PRESCRIBED: 25
RAD ONC ARIA PLAN TOTAL PRESCRIBED DOSE: 5000 CGY
RAD ONC ARIA REFERENCE POINT DOSAGE GIVEN TO DATE: 16 GY
RAD ONC ARIA REFERENCE POINT ID: NORMAL
RAD ONC ARIA REFERENCE POINT SESSION DOSAGE GIVEN: 2 GY

## 2024-05-28 PROCEDURE — 77412 RADIATION TX DELIVERY LVL 3: CPT

## 2024-05-29 ENCOUNTER — HOSPITAL ENCOUNTER (OUTPATIENT)
Facility: HOSPITAL | Age: 59
Setting detail: RECURRING SERIES
Discharge: HOME | End: 2024-06-01
Attending: RADIOLOGY
Payer: MEDICAID

## 2024-05-29 LAB
RAD ONC ARIA COURSE FIRST TREATMENT DATE: NORMAL
RAD ONC ARIA COURSE ID: NORMAL
RAD ONC ARIA COURSE INTENT: NORMAL
RAD ONC ARIA COURSE LAST TREATMENT DATE: NORMAL
RAD ONC ARIA COURSE SESSION NUMBER: 9
RAD ONC ARIA COURSE START DATE: NORMAL
RAD ONC ARIA COURSE TREATMENT ELAPSED DAYS: 13
RAD ONC ARIA PLAN FRACTIONS TREATED TO DATE: 9
RAD ONC ARIA PLAN ID: NORMAL
RAD ONC ARIA PLAN PRESCRIBED DOSE PER FRACTION: 2 GY
RAD ONC ARIA PLAN PRIMARY REFERENCE POINT: NORMAL
RAD ONC ARIA PLAN TOTAL FRACTIONS PRESCRIBED: 25
RAD ONC ARIA PLAN TOTAL PRESCRIBED DOSE: 5000 CGY
RAD ONC ARIA REFERENCE POINT DOSAGE GIVEN TO DATE: 18 GY
RAD ONC ARIA REFERENCE POINT ID: NORMAL
RAD ONC ARIA REFERENCE POINT SESSION DOSAGE GIVEN: 2 GY

## 2024-05-29 PROCEDURE — 77412 RADIATION TX DELIVERY LVL 3: CPT

## 2024-05-30 ENCOUNTER — HOSPITAL ENCOUNTER (OUTPATIENT)
Facility: HOSPITAL | Age: 59
Setting detail: RECURRING SERIES
End: 2024-05-30
Attending: RADIOLOGY
Payer: MEDICAID

## 2024-05-30 LAB
RAD ONC ARIA COURSE FIRST TREATMENT DATE: NORMAL
RAD ONC ARIA COURSE ID: NORMAL
RAD ONC ARIA COURSE INTENT: NORMAL
RAD ONC ARIA COURSE LAST TREATMENT DATE: NORMAL
RAD ONC ARIA COURSE SESSION NUMBER: 10
RAD ONC ARIA COURSE START DATE: NORMAL
RAD ONC ARIA COURSE TREATMENT ELAPSED DAYS: 14
RAD ONC ARIA PLAN FRACTIONS TREATED TO DATE: 10
RAD ONC ARIA PLAN ID: NORMAL
RAD ONC ARIA PLAN PRESCRIBED DOSE PER FRACTION: 2 GY
RAD ONC ARIA PLAN PRIMARY REFERENCE POINT: NORMAL
RAD ONC ARIA PLAN TOTAL FRACTIONS PRESCRIBED: 25
RAD ONC ARIA PLAN TOTAL PRESCRIBED DOSE: 5000 CGY
RAD ONC ARIA REFERENCE POINT DOSAGE GIVEN TO DATE: 20 GY
RAD ONC ARIA REFERENCE POINT ID: NORMAL
RAD ONC ARIA REFERENCE POINT SESSION DOSAGE GIVEN: 2 GY

## 2024-05-30 PROCEDURE — 77412 RADIATION TX DELIVERY LVL 3: CPT

## 2024-05-30 PROCEDURE — 77336 RADIATION PHYSICS CONSULT: CPT

## 2024-05-31 ENCOUNTER — HOSPITAL ENCOUNTER (OUTPATIENT)
Facility: HOSPITAL | Age: 59
Setting detail: RECURRING SERIES
End: 2024-05-31
Attending: RADIOLOGY
Payer: MEDICAID

## 2024-05-31 LAB
RAD ONC ARIA COURSE FIRST TREATMENT DATE: NORMAL
RAD ONC ARIA COURSE ID: NORMAL
RAD ONC ARIA COURSE INTENT: NORMAL
RAD ONC ARIA COURSE LAST TREATMENT DATE: NORMAL
RAD ONC ARIA COURSE SESSION NUMBER: 11
RAD ONC ARIA COURSE START DATE: NORMAL
RAD ONC ARIA COURSE TREATMENT ELAPSED DAYS: 15
RAD ONC ARIA PLAN FRACTIONS TREATED TO DATE: 11
RAD ONC ARIA PLAN ID: NORMAL
RAD ONC ARIA PLAN PRESCRIBED DOSE PER FRACTION: 2 GY
RAD ONC ARIA PLAN PRIMARY REFERENCE POINT: NORMAL
RAD ONC ARIA PLAN TOTAL FRACTIONS PRESCRIBED: 25
RAD ONC ARIA PLAN TOTAL PRESCRIBED DOSE: 5000 CGY
RAD ONC ARIA REFERENCE POINT DOSAGE GIVEN TO DATE: 22 GY
RAD ONC ARIA REFERENCE POINT ID: NORMAL
RAD ONC ARIA REFERENCE POINT SESSION DOSAGE GIVEN: 2 GY

## 2024-05-31 PROCEDURE — 77417 THER RADIOLOGY PORT IMAGE(S): CPT

## 2024-05-31 PROCEDURE — 77412 RADIATION TX DELIVERY LVL 3: CPT

## 2024-05-31 PROCEDURE — 77427 RADIATION TX MANAGEMENT X5: CPT | Performed by: RADIOLOGY

## 2024-06-03 ENCOUNTER — HOSPITAL ENCOUNTER (OUTPATIENT)
Facility: HOSPITAL | Age: 59
Setting detail: RECURRING SERIES
Discharge: HOME | End: 2024-06-06
Attending: RADIOLOGY
Payer: MEDICAID

## 2024-06-03 LAB
RAD ONC ARIA COURSE FIRST TREATMENT DATE: NORMAL
RAD ONC ARIA COURSE ID: NORMAL
RAD ONC ARIA COURSE INTENT: NORMAL
RAD ONC ARIA COURSE LAST TREATMENT DATE: NORMAL
RAD ONC ARIA COURSE SESSION NUMBER: 12
RAD ONC ARIA COURSE START DATE: NORMAL
RAD ONC ARIA COURSE TREATMENT ELAPSED DAYS: 18
RAD ONC ARIA PLAN FRACTIONS TREATED TO DATE: 12
RAD ONC ARIA PLAN ID: NORMAL
RAD ONC ARIA PLAN PRESCRIBED DOSE PER FRACTION: 2 GY
RAD ONC ARIA PLAN PRIMARY REFERENCE POINT: NORMAL
RAD ONC ARIA PLAN TOTAL FRACTIONS PRESCRIBED: 25
RAD ONC ARIA PLAN TOTAL PRESCRIBED DOSE: 5000 CGY
RAD ONC ARIA REFERENCE POINT DOSAGE GIVEN TO DATE: 24 GY
RAD ONC ARIA REFERENCE POINT ID: NORMAL
RAD ONC ARIA REFERENCE POINT SESSION DOSAGE GIVEN: 2 GY

## 2024-06-03 PROCEDURE — 77412 RADIATION TX DELIVERY LVL 3: CPT

## 2024-06-04 ENCOUNTER — HOSPITAL ENCOUNTER (OUTPATIENT)
Facility: HOSPITAL | Age: 59
Setting detail: RECURRING SERIES
Discharge: HOME | End: 2024-06-07
Attending: RADIOLOGY
Payer: MEDICAID

## 2024-06-04 LAB
RAD ONC ARIA COURSE FIRST TREATMENT DATE: NORMAL
RAD ONC ARIA COURSE ID: NORMAL
RAD ONC ARIA COURSE INTENT: NORMAL
RAD ONC ARIA COURSE LAST TREATMENT DATE: NORMAL
RAD ONC ARIA COURSE SESSION NUMBER: 13
RAD ONC ARIA COURSE START DATE: NORMAL
RAD ONC ARIA COURSE TREATMENT ELAPSED DAYS: 19
RAD ONC ARIA PLAN FRACTIONS TREATED TO DATE: 13
RAD ONC ARIA PLAN ID: NORMAL
RAD ONC ARIA PLAN PRESCRIBED DOSE PER FRACTION: 2 GY
RAD ONC ARIA PLAN PRIMARY REFERENCE POINT: NORMAL
RAD ONC ARIA PLAN TOTAL FRACTIONS PRESCRIBED: 25
RAD ONC ARIA PLAN TOTAL PRESCRIBED DOSE: 5000 CGY
RAD ONC ARIA REFERENCE POINT DOSAGE GIVEN TO DATE: 26 GY
RAD ONC ARIA REFERENCE POINT ID: NORMAL
RAD ONC ARIA REFERENCE POINT SESSION DOSAGE GIVEN: 2 GY

## 2024-06-04 PROCEDURE — 77412 RADIATION TX DELIVERY LVL 3: CPT

## 2024-06-05 ENCOUNTER — HOSPITAL ENCOUNTER (OUTPATIENT)
Facility: HOSPITAL | Age: 59
Setting detail: RECURRING SERIES
Discharge: HOME | End: 2024-06-08
Attending: RADIOLOGY
Payer: MEDICAID

## 2024-06-05 LAB
RAD ONC ARIA COURSE FIRST TREATMENT DATE: NORMAL
RAD ONC ARIA COURSE ID: NORMAL
RAD ONC ARIA COURSE INTENT: NORMAL
RAD ONC ARIA COURSE LAST TREATMENT DATE: NORMAL
RAD ONC ARIA COURSE SESSION NUMBER: 14
RAD ONC ARIA COURSE START DATE: NORMAL
RAD ONC ARIA COURSE TREATMENT ELAPSED DAYS: 20
RAD ONC ARIA PLAN FRACTIONS TREATED TO DATE: 14
RAD ONC ARIA PLAN ID: NORMAL
RAD ONC ARIA PLAN PRESCRIBED DOSE PER FRACTION: 2 GY
RAD ONC ARIA PLAN PRIMARY REFERENCE POINT: NORMAL
RAD ONC ARIA PLAN TOTAL FRACTIONS PRESCRIBED: 25
RAD ONC ARIA PLAN TOTAL PRESCRIBED DOSE: 5000 CGY
RAD ONC ARIA REFERENCE POINT DOSAGE GIVEN TO DATE: 28 GY
RAD ONC ARIA REFERENCE POINT ID: NORMAL
RAD ONC ARIA REFERENCE POINT SESSION DOSAGE GIVEN: 2 GY

## 2024-06-05 PROCEDURE — 77412 RADIATION TX DELIVERY LVL 3: CPT

## 2024-06-06 ENCOUNTER — HOSPITAL ENCOUNTER (OUTPATIENT)
Facility: HOSPITAL | Age: 59
Setting detail: RECURRING SERIES
Discharge: HOME | End: 2024-06-09
Attending: RADIOLOGY
Payer: MEDICAID

## 2024-06-06 LAB
RAD ONC ARIA COURSE FIRST TREATMENT DATE: NORMAL
RAD ONC ARIA COURSE ID: NORMAL
RAD ONC ARIA COURSE INTENT: NORMAL
RAD ONC ARIA COURSE LAST TREATMENT DATE: NORMAL
RAD ONC ARIA COURSE SESSION NUMBER: 15
RAD ONC ARIA COURSE START DATE: NORMAL
RAD ONC ARIA COURSE TREATMENT ELAPSED DAYS: 21
RAD ONC ARIA PLAN FRACTIONS TREATED TO DATE: 15
RAD ONC ARIA PLAN ID: NORMAL
RAD ONC ARIA PLAN PRESCRIBED DOSE PER FRACTION: 2 GY
RAD ONC ARIA PLAN PRIMARY REFERENCE POINT: NORMAL
RAD ONC ARIA PLAN TOTAL FRACTIONS PRESCRIBED: 25
RAD ONC ARIA PLAN TOTAL PRESCRIBED DOSE: 5000 CGY
RAD ONC ARIA REFERENCE POINT DOSAGE GIVEN TO DATE: 30 GY
RAD ONC ARIA REFERENCE POINT ID: NORMAL
RAD ONC ARIA REFERENCE POINT SESSION DOSAGE GIVEN: 2 GY

## 2024-06-06 PROCEDURE — 77336 RADIATION PHYSICS CONSULT: CPT

## 2024-06-06 PROCEDURE — 77412 RADIATION TX DELIVERY LVL 3: CPT

## 2024-06-07 ENCOUNTER — HOSPITAL ENCOUNTER (OUTPATIENT)
Facility: HOSPITAL | Age: 59
Setting detail: RECURRING SERIES
Discharge: HOME | End: 2024-06-10
Attending: RADIOLOGY
Payer: MEDICAID

## 2024-06-07 LAB
RAD ONC ARIA COURSE FIRST TREATMENT DATE: NORMAL
RAD ONC ARIA COURSE ID: NORMAL
RAD ONC ARIA COURSE INTENT: NORMAL
RAD ONC ARIA COURSE LAST TREATMENT DATE: NORMAL
RAD ONC ARIA COURSE SESSION NUMBER: 16
RAD ONC ARIA COURSE START DATE: NORMAL
RAD ONC ARIA COURSE TREATMENT ELAPSED DAYS: 22
RAD ONC ARIA PLAN FRACTIONS TREATED TO DATE: 16
RAD ONC ARIA PLAN ID: NORMAL
RAD ONC ARIA PLAN PRESCRIBED DOSE PER FRACTION: 2 GY
RAD ONC ARIA PLAN PRIMARY REFERENCE POINT: NORMAL
RAD ONC ARIA PLAN TOTAL FRACTIONS PRESCRIBED: 25
RAD ONC ARIA PLAN TOTAL PRESCRIBED DOSE: 5000 CGY
RAD ONC ARIA REFERENCE POINT DOSAGE GIVEN TO DATE: 32 GY
RAD ONC ARIA REFERENCE POINT ID: NORMAL
RAD ONC ARIA REFERENCE POINT SESSION DOSAGE GIVEN: 2 GY

## 2024-06-07 PROCEDURE — 77417 THER RADIOLOGY PORT IMAGE(S): CPT

## 2024-06-07 PROCEDURE — 77412 RADIATION TX DELIVERY LVL 3: CPT

## 2024-06-10 ENCOUNTER — HOSPITAL ENCOUNTER (OUTPATIENT)
Facility: HOSPITAL | Age: 59
Setting detail: RECURRING SERIES
Discharge: HOME | End: 2024-06-13
Attending: RADIOLOGY
Payer: MEDICAID

## 2024-06-10 LAB
RAD ONC ARIA COURSE FIRST TREATMENT DATE: NORMAL
RAD ONC ARIA COURSE ID: NORMAL
RAD ONC ARIA COURSE INTENT: NORMAL
RAD ONC ARIA COURSE LAST TREATMENT DATE: NORMAL
RAD ONC ARIA COURSE SESSION NUMBER: 17
RAD ONC ARIA COURSE START DATE: NORMAL
RAD ONC ARIA COURSE TREATMENT ELAPSED DAYS: 25
RAD ONC ARIA PLAN FRACTIONS TREATED TO DATE: 17
RAD ONC ARIA PLAN ID: NORMAL
RAD ONC ARIA PLAN PRESCRIBED DOSE PER FRACTION: 2 GY
RAD ONC ARIA PLAN PRIMARY REFERENCE POINT: NORMAL
RAD ONC ARIA PLAN TOTAL FRACTIONS PRESCRIBED: 25
RAD ONC ARIA PLAN TOTAL PRESCRIBED DOSE: 5000 CGY
RAD ONC ARIA REFERENCE POINT DOSAGE GIVEN TO DATE: 34 GY
RAD ONC ARIA REFERENCE POINT ID: NORMAL
RAD ONC ARIA REFERENCE POINT SESSION DOSAGE GIVEN: 2 GY

## 2024-06-10 PROCEDURE — 77412 RADIATION TX DELIVERY LVL 3: CPT

## 2024-06-11 ENCOUNTER — HOSPITAL ENCOUNTER (OUTPATIENT)
Facility: HOSPITAL | Age: 59
Setting detail: RECURRING SERIES
Discharge: HOME | End: 2024-06-14
Attending: RADIOLOGY
Payer: MEDICAID

## 2024-06-11 LAB
RAD ONC ARIA COURSE FIRST TREATMENT DATE: NORMAL
RAD ONC ARIA COURSE ID: NORMAL
RAD ONC ARIA COURSE INTENT: NORMAL
RAD ONC ARIA COURSE LAST TREATMENT DATE: NORMAL
RAD ONC ARIA COURSE SESSION NUMBER: 18
RAD ONC ARIA COURSE START DATE: NORMAL
RAD ONC ARIA COURSE TREATMENT ELAPSED DAYS: 26
RAD ONC ARIA PLAN FRACTIONS TREATED TO DATE: 18
RAD ONC ARIA PLAN ID: NORMAL
RAD ONC ARIA PLAN PRESCRIBED DOSE PER FRACTION: 2 GY
RAD ONC ARIA PLAN PRIMARY REFERENCE POINT: NORMAL
RAD ONC ARIA PLAN TOTAL FRACTIONS PRESCRIBED: 25
RAD ONC ARIA PLAN TOTAL PRESCRIBED DOSE: 5000 CGY
RAD ONC ARIA REFERENCE POINT DOSAGE GIVEN TO DATE: 36 GY
RAD ONC ARIA REFERENCE POINT ID: NORMAL
RAD ONC ARIA REFERENCE POINT SESSION DOSAGE GIVEN: 2 GY

## 2024-06-11 PROCEDURE — 77412 RADIATION TX DELIVERY LVL 3: CPT

## 2024-06-12 ENCOUNTER — HOSPITAL ENCOUNTER (OUTPATIENT)
Facility: HOSPITAL | Age: 59
Setting detail: RECURRING SERIES
Discharge: HOME | End: 2024-06-15
Attending: RADIOLOGY
Payer: MEDICAID

## 2024-06-12 LAB
RAD ONC ARIA COURSE FIRST TREATMENT DATE: NORMAL
RAD ONC ARIA COURSE ID: NORMAL
RAD ONC ARIA COURSE INTENT: NORMAL
RAD ONC ARIA COURSE LAST TREATMENT DATE: NORMAL
RAD ONC ARIA COURSE SESSION NUMBER: 19
RAD ONC ARIA COURSE START DATE: NORMAL
RAD ONC ARIA COURSE TREATMENT ELAPSED DAYS: 27
RAD ONC ARIA PLAN FRACTIONS TREATED TO DATE: 19
RAD ONC ARIA PLAN ID: NORMAL
RAD ONC ARIA PLAN PRESCRIBED DOSE PER FRACTION: 2 GY
RAD ONC ARIA PLAN PRIMARY REFERENCE POINT: NORMAL
RAD ONC ARIA PLAN TOTAL FRACTIONS PRESCRIBED: 25
RAD ONC ARIA PLAN TOTAL PRESCRIBED DOSE: 5000 CGY
RAD ONC ARIA REFERENCE POINT DOSAGE GIVEN TO DATE: 38 GY
RAD ONC ARIA REFERENCE POINT ID: NORMAL
RAD ONC ARIA REFERENCE POINT SESSION DOSAGE GIVEN: 2 GY

## 2024-06-12 PROCEDURE — 77412 RADIATION TX DELIVERY LVL 3: CPT

## 2024-06-13 ENCOUNTER — HOSPITAL ENCOUNTER (OUTPATIENT)
Facility: HOSPITAL | Age: 59
Setting detail: RECURRING SERIES
Discharge: HOME | End: 2024-06-16
Attending: RADIOLOGY
Payer: MEDICAID

## 2024-06-13 LAB
RAD ONC ARIA COURSE FIRST TREATMENT DATE: NORMAL
RAD ONC ARIA COURSE ID: NORMAL
RAD ONC ARIA COURSE INTENT: NORMAL
RAD ONC ARIA COURSE LAST TREATMENT DATE: NORMAL
RAD ONC ARIA COURSE SESSION NUMBER: 20
RAD ONC ARIA COURSE START DATE: NORMAL
RAD ONC ARIA COURSE TREATMENT ELAPSED DAYS: 28
RAD ONC ARIA PLAN FRACTIONS TREATED TO DATE: 20
RAD ONC ARIA PLAN ID: NORMAL
RAD ONC ARIA PLAN PRESCRIBED DOSE PER FRACTION: 2 GY
RAD ONC ARIA PLAN PRIMARY REFERENCE POINT: NORMAL
RAD ONC ARIA PLAN TOTAL FRACTIONS PRESCRIBED: 25
RAD ONC ARIA PLAN TOTAL PRESCRIBED DOSE: 5000 CGY
RAD ONC ARIA REFERENCE POINT DOSAGE GIVEN TO DATE: 40 GY
RAD ONC ARIA REFERENCE POINT ID: NORMAL
RAD ONC ARIA REFERENCE POINT SESSION DOSAGE GIVEN: 2 GY

## 2024-06-13 PROCEDURE — 77336 RADIATION PHYSICS CONSULT: CPT

## 2024-06-13 PROCEDURE — 77412 RADIATION TX DELIVERY LVL 3: CPT

## 2024-06-14 ENCOUNTER — HOSPITAL ENCOUNTER (OUTPATIENT)
Facility: HOSPITAL | Age: 59
Setting detail: RECURRING SERIES
Discharge: HOME | End: 2024-06-17
Attending: RADIOLOGY
Payer: MEDICAID

## 2024-06-14 LAB
RAD ONC ARIA COURSE FIRST TREATMENT DATE: NORMAL
RAD ONC ARIA COURSE ID: NORMAL
RAD ONC ARIA COURSE INTENT: NORMAL
RAD ONC ARIA COURSE LAST TREATMENT DATE: NORMAL
RAD ONC ARIA COURSE SESSION NUMBER: 21
RAD ONC ARIA COURSE START DATE: NORMAL
RAD ONC ARIA COURSE TREATMENT ELAPSED DAYS: 29
RAD ONC ARIA PLAN FRACTIONS TREATED TO DATE: 21
RAD ONC ARIA PLAN ID: NORMAL
RAD ONC ARIA PLAN PRESCRIBED DOSE PER FRACTION: 2 GY
RAD ONC ARIA PLAN PRIMARY REFERENCE POINT: NORMAL
RAD ONC ARIA PLAN TOTAL FRACTIONS PRESCRIBED: 25
RAD ONC ARIA PLAN TOTAL PRESCRIBED DOSE: 5000 CGY
RAD ONC ARIA REFERENCE POINT DOSAGE GIVEN TO DATE: 42 GY
RAD ONC ARIA REFERENCE POINT ID: NORMAL
RAD ONC ARIA REFERENCE POINT SESSION DOSAGE GIVEN: 2 GY

## 2024-06-14 PROCEDURE — 77417 THER RADIOLOGY PORT IMAGE(S): CPT

## 2024-06-14 PROCEDURE — 77427 RADIATION TX MANAGEMENT X5: CPT | Performed by: RADIOLOGY

## 2024-06-14 PROCEDURE — 77412 RADIATION TX DELIVERY LVL 3: CPT

## 2024-06-17 ENCOUNTER — HOSPITAL ENCOUNTER (OUTPATIENT)
Facility: HOSPITAL | Age: 59
Setting detail: RECURRING SERIES
Discharge: HOME | End: 2024-06-20
Attending: RADIOLOGY
Payer: MEDICAID

## 2024-06-17 LAB
RAD ONC ARIA COURSE FIRST TREATMENT DATE: NORMAL
RAD ONC ARIA COURSE ID: NORMAL
RAD ONC ARIA COURSE INTENT: NORMAL
RAD ONC ARIA COURSE LAST TREATMENT DATE: NORMAL
RAD ONC ARIA COURSE SESSION NUMBER: 22
RAD ONC ARIA COURSE START DATE: NORMAL
RAD ONC ARIA COURSE TREATMENT ELAPSED DAYS: 32
RAD ONC ARIA PLAN FRACTIONS TREATED TO DATE: 22
RAD ONC ARIA PLAN ID: NORMAL
RAD ONC ARIA PLAN PRESCRIBED DOSE PER FRACTION: 2 GY
RAD ONC ARIA PLAN PRIMARY REFERENCE POINT: NORMAL
RAD ONC ARIA PLAN TOTAL FRACTIONS PRESCRIBED: 25
RAD ONC ARIA PLAN TOTAL PRESCRIBED DOSE: 5000 CGY
RAD ONC ARIA REFERENCE POINT DOSAGE GIVEN TO DATE: 44 GY
RAD ONC ARIA REFERENCE POINT ID: NORMAL
RAD ONC ARIA REFERENCE POINT SESSION DOSAGE GIVEN: 2 GY

## 2024-06-17 PROCEDURE — 77412 RADIATION TX DELIVERY LVL 3: CPT

## 2024-06-18 ENCOUNTER — HOSPITAL ENCOUNTER (OUTPATIENT)
Facility: HOSPITAL | Age: 59
Setting detail: RECURRING SERIES
Discharge: HOME | End: 2024-06-21
Attending: RADIOLOGY
Payer: MEDICAID

## 2024-06-18 LAB
RAD ONC ARIA COURSE FIRST TREATMENT DATE: NORMAL
RAD ONC ARIA COURSE ID: NORMAL
RAD ONC ARIA COURSE INTENT: NORMAL
RAD ONC ARIA COURSE LAST TREATMENT DATE: NORMAL
RAD ONC ARIA COURSE SESSION NUMBER: 23
RAD ONC ARIA COURSE START DATE: NORMAL
RAD ONC ARIA COURSE TREATMENT ELAPSED DAYS: 33
RAD ONC ARIA PLAN FRACTIONS TREATED TO DATE: 23
RAD ONC ARIA PLAN ID: NORMAL
RAD ONC ARIA PLAN PRESCRIBED DOSE PER FRACTION: 2 GY
RAD ONC ARIA PLAN PRIMARY REFERENCE POINT: NORMAL
RAD ONC ARIA PLAN TOTAL FRACTIONS PRESCRIBED: 25
RAD ONC ARIA PLAN TOTAL PRESCRIBED DOSE: 5000 CGY
RAD ONC ARIA REFERENCE POINT DOSAGE GIVEN TO DATE: 46 GY
RAD ONC ARIA REFERENCE POINT ID: NORMAL
RAD ONC ARIA REFERENCE POINT SESSION DOSAGE GIVEN: 2 GY

## 2024-06-18 PROCEDURE — 77412 RADIATION TX DELIVERY LVL 3: CPT

## 2024-06-19 ENCOUNTER — HOSPITAL ENCOUNTER (OUTPATIENT)
Facility: HOSPITAL | Age: 59
Setting detail: RECURRING SERIES
Discharge: HOME | End: 2024-06-22
Attending: RADIOLOGY
Payer: MEDICAID

## 2024-06-19 LAB
RAD ONC ARIA COURSE FIRST TREATMENT DATE: NORMAL
RAD ONC ARIA COURSE ID: NORMAL
RAD ONC ARIA COURSE INTENT: NORMAL
RAD ONC ARIA COURSE LAST TREATMENT DATE: NORMAL
RAD ONC ARIA COURSE SESSION NUMBER: 24
RAD ONC ARIA COURSE START DATE: NORMAL
RAD ONC ARIA COURSE TREATMENT ELAPSED DAYS: 34
RAD ONC ARIA PLAN FRACTIONS TREATED TO DATE: 24
RAD ONC ARIA PLAN ID: NORMAL
RAD ONC ARIA PLAN PRESCRIBED DOSE PER FRACTION: 2 GY
RAD ONC ARIA PLAN PRIMARY REFERENCE POINT: NORMAL
RAD ONC ARIA PLAN TOTAL FRACTIONS PRESCRIBED: 25
RAD ONC ARIA PLAN TOTAL PRESCRIBED DOSE: 5000 CGY
RAD ONC ARIA REFERENCE POINT DOSAGE GIVEN TO DATE: 48 GY
RAD ONC ARIA REFERENCE POINT ID: NORMAL
RAD ONC ARIA REFERENCE POINT SESSION DOSAGE GIVEN: 2 GY

## 2024-06-19 PROCEDURE — 77412 RADIATION TX DELIVERY LVL 3: CPT

## 2024-06-20 ENCOUNTER — HOSPITAL ENCOUNTER (OUTPATIENT)
Facility: HOSPITAL | Age: 59
Setting detail: RECURRING SERIES
Discharge: HOME | End: 2024-06-23
Attending: RADIOLOGY
Payer: MEDICAID

## 2024-06-20 LAB
RAD ONC ARIA COURSE FIRST TREATMENT DATE: NORMAL
RAD ONC ARIA COURSE ID: NORMAL
RAD ONC ARIA COURSE INTENT: NORMAL
RAD ONC ARIA COURSE LAST TREATMENT DATE: NORMAL
RAD ONC ARIA COURSE SESSION NUMBER: 25
RAD ONC ARIA COURSE START DATE: NORMAL
RAD ONC ARIA COURSE TREATMENT ELAPSED DAYS: 35
RAD ONC ARIA PLAN FRACTIONS TREATED TO DATE: 25
RAD ONC ARIA PLAN ID: NORMAL
RAD ONC ARIA PLAN PRESCRIBED DOSE PER FRACTION: 2 GY
RAD ONC ARIA PLAN PRIMARY REFERENCE POINT: NORMAL
RAD ONC ARIA PLAN TOTAL FRACTIONS PRESCRIBED: 25
RAD ONC ARIA PLAN TOTAL PRESCRIBED DOSE: 5000 CGY
RAD ONC ARIA REFERENCE POINT DOSAGE GIVEN TO DATE: 50 GY
RAD ONC ARIA REFERENCE POINT ID: NORMAL
RAD ONC ARIA REFERENCE POINT SESSION DOSAGE GIVEN: 2 GY

## 2024-06-20 PROCEDURE — 77336 RADIATION PHYSICS CONSULT: CPT

## 2024-06-20 PROCEDURE — 77334 RADIATION TREATMENT AID(S): CPT

## 2024-06-20 PROCEDURE — 77307 TELETHX ISODOSE PLAN CPLX: CPT

## 2024-06-20 PROCEDURE — 77412 RADIATION TX DELIVERY LVL 3: CPT

## 2024-06-21 ENCOUNTER — HOSPITAL ENCOUNTER (OUTPATIENT)
Facility: HOSPITAL | Age: 59
Setting detail: RECURRING SERIES
Discharge: HOME | End: 2024-06-24
Attending: RADIOLOGY
Payer: MEDICAID

## 2024-06-21 LAB
RAD ONC ARIA COURSE FIRST TREATMENT DATE: NORMAL
RAD ONC ARIA COURSE ID: NORMAL
RAD ONC ARIA COURSE INTENT: NORMAL
RAD ONC ARIA COURSE LAST TREATMENT DATE: NORMAL
RAD ONC ARIA COURSE SESSION NUMBER: 26
RAD ONC ARIA COURSE START DATE: NORMAL
RAD ONC ARIA COURSE TREATMENT ELAPSED DAYS: 36
RAD ONC ARIA PLAN FRACTIONS TREATED TO DATE: 1
RAD ONC ARIA PLAN ID: NORMAL
RAD ONC ARIA PLAN PRESCRIBED DOSE PER FRACTION: 2 GY
RAD ONC ARIA PLAN PRIMARY REFERENCE POINT: NORMAL
RAD ONC ARIA PLAN TOTAL FRACTIONS PRESCRIBED: 5
RAD ONC ARIA PLAN TOTAL PRESCRIBED DOSE: 1000 CGY
RAD ONC ARIA REFERENCE POINT DOSAGE GIVEN TO DATE: 2 GY
RAD ONC ARIA REFERENCE POINT ID: NORMAL
RAD ONC ARIA REFERENCE POINT SESSION DOSAGE GIVEN: 2 GY

## 2024-06-21 PROCEDURE — 77280 THER RAD SIMULAJ FIELD SMPL: CPT

## 2024-06-21 PROCEDURE — 77412 RADIATION TX DELIVERY LVL 3: CPT

## 2024-06-21 PROCEDURE — 77427 RADIATION TX MANAGEMENT X5: CPT | Performed by: RADIOLOGY

## 2024-06-24 ENCOUNTER — HOSPITAL ENCOUNTER (OUTPATIENT)
Facility: HOSPITAL | Age: 59
Setting detail: RECURRING SERIES
Discharge: HOME | End: 2024-06-27
Attending: RADIOLOGY
Payer: MEDICAID

## 2024-06-24 LAB
RAD ONC ARIA COURSE FIRST TREATMENT DATE: NORMAL
RAD ONC ARIA COURSE ID: NORMAL
RAD ONC ARIA COURSE INTENT: NORMAL
RAD ONC ARIA COURSE LAST TREATMENT DATE: NORMAL
RAD ONC ARIA COURSE SESSION NUMBER: 27
RAD ONC ARIA COURSE START DATE: NORMAL
RAD ONC ARIA COURSE TREATMENT ELAPSED DAYS: 39
RAD ONC ARIA PLAN FRACTIONS TREATED TO DATE: 2
RAD ONC ARIA PLAN ID: NORMAL
RAD ONC ARIA PLAN PRESCRIBED DOSE PER FRACTION: 2 GY
RAD ONC ARIA PLAN PRIMARY REFERENCE POINT: NORMAL
RAD ONC ARIA PLAN TOTAL FRACTIONS PRESCRIBED: 5
RAD ONC ARIA PLAN TOTAL PRESCRIBED DOSE: 1000 CGY
RAD ONC ARIA REFERENCE POINT DOSAGE GIVEN TO DATE: 4 GY
RAD ONC ARIA REFERENCE POINT ID: NORMAL
RAD ONC ARIA REFERENCE POINT SESSION DOSAGE GIVEN: 2 GY

## 2024-06-24 PROCEDURE — 77412 RADIATION TX DELIVERY LVL 3: CPT

## 2024-06-24 PROCEDURE — 77417 THER RADIOLOGY PORT IMAGE(S): CPT

## 2024-06-25 ENCOUNTER — HOSPITAL ENCOUNTER (OUTPATIENT)
Facility: HOSPITAL | Age: 59
Setting detail: RECURRING SERIES
Discharge: HOME | End: 2024-06-28
Attending: RADIOLOGY
Payer: MEDICAID

## 2024-06-25 LAB
RAD ONC ARIA COURSE FIRST TREATMENT DATE: NORMAL
RAD ONC ARIA COURSE ID: NORMAL
RAD ONC ARIA COURSE INTENT: NORMAL
RAD ONC ARIA COURSE LAST TREATMENT DATE: NORMAL
RAD ONC ARIA COURSE SESSION NUMBER: 28
RAD ONC ARIA COURSE START DATE: NORMAL
RAD ONC ARIA COURSE TREATMENT ELAPSED DAYS: 40
RAD ONC ARIA PLAN FRACTIONS TREATED TO DATE: 3
RAD ONC ARIA PLAN ID: NORMAL
RAD ONC ARIA PLAN PRESCRIBED DOSE PER FRACTION: 2 GY
RAD ONC ARIA PLAN PRIMARY REFERENCE POINT: NORMAL
RAD ONC ARIA PLAN TOTAL FRACTIONS PRESCRIBED: 5
RAD ONC ARIA PLAN TOTAL PRESCRIBED DOSE: 1000 CGY
RAD ONC ARIA REFERENCE POINT DOSAGE GIVEN TO DATE: 6 GY
RAD ONC ARIA REFERENCE POINT ID: NORMAL
RAD ONC ARIA REFERENCE POINT SESSION DOSAGE GIVEN: 2 GY

## 2024-06-25 PROCEDURE — 77412 RADIATION TX DELIVERY LVL 3: CPT

## 2024-06-26 ENCOUNTER — HOSPITAL ENCOUNTER (OUTPATIENT)
Facility: HOSPITAL | Age: 59
Setting detail: RECURRING SERIES
Discharge: HOME | End: 2024-06-29
Attending: RADIOLOGY
Payer: MEDICAID

## 2024-06-26 LAB
RAD ONC ARIA COURSE FIRST TREATMENT DATE: NORMAL
RAD ONC ARIA COURSE ID: NORMAL
RAD ONC ARIA COURSE INTENT: NORMAL
RAD ONC ARIA COURSE LAST TREATMENT DATE: NORMAL
RAD ONC ARIA COURSE SESSION NUMBER: 29
RAD ONC ARIA COURSE START DATE: NORMAL
RAD ONC ARIA COURSE TREATMENT ELAPSED DAYS: 41
RAD ONC ARIA PLAN FRACTIONS TREATED TO DATE: 4
RAD ONC ARIA PLAN ID: NORMAL
RAD ONC ARIA PLAN PRESCRIBED DOSE PER FRACTION: 2 GY
RAD ONC ARIA PLAN PRIMARY REFERENCE POINT: NORMAL
RAD ONC ARIA PLAN TOTAL FRACTIONS PRESCRIBED: 5
RAD ONC ARIA PLAN TOTAL PRESCRIBED DOSE: 1000 CGY
RAD ONC ARIA REFERENCE POINT DOSAGE GIVEN TO DATE: 8 GY
RAD ONC ARIA REFERENCE POINT ID: NORMAL
RAD ONC ARIA REFERENCE POINT SESSION DOSAGE GIVEN: 2 GY

## 2024-06-26 PROCEDURE — 77412 RADIATION TX DELIVERY LVL 3: CPT

## 2024-06-27 ENCOUNTER — HOSPITAL ENCOUNTER (OUTPATIENT)
Facility: HOSPITAL | Age: 59
Setting detail: RECURRING SERIES
Discharge: HOME | End: 2024-06-30
Attending: RADIOLOGY
Payer: MEDICAID

## 2024-06-27 LAB
RAD ONC ARIA COURSE FIRST TREATMENT DATE: NORMAL
RAD ONC ARIA COURSE ID: NORMAL
RAD ONC ARIA COURSE INTENT: NORMAL
RAD ONC ARIA COURSE LAST TREATMENT DATE: NORMAL
RAD ONC ARIA COURSE SESSION NUMBER: 30
RAD ONC ARIA COURSE START DATE: NORMAL
RAD ONC ARIA COURSE TREATMENT ELAPSED DAYS: 42
RAD ONC ARIA PLAN FRACTIONS TREATED TO DATE: 5
RAD ONC ARIA PLAN ID: NORMAL
RAD ONC ARIA PLAN PRESCRIBED DOSE PER FRACTION: 2 GY
RAD ONC ARIA PLAN PRIMARY REFERENCE POINT: NORMAL
RAD ONC ARIA PLAN TOTAL FRACTIONS PRESCRIBED: 5
RAD ONC ARIA PLAN TOTAL PRESCRIBED DOSE: 1000 CGY
RAD ONC ARIA REFERENCE POINT DOSAGE GIVEN TO DATE: 10 GY
RAD ONC ARIA REFERENCE POINT ID: NORMAL
RAD ONC ARIA REFERENCE POINT SESSION DOSAGE GIVEN: 2 GY

## 2024-06-27 PROCEDURE — 77336 RADIATION PHYSICS CONSULT: CPT

## 2024-06-27 PROCEDURE — 77412 RADIATION TX DELIVERY LVL 3: CPT

## 2024-08-06 ENCOUNTER — HOSPITAL ENCOUNTER (OUTPATIENT)
Facility: HOSPITAL | Age: 59
Setting detail: RECURRING SERIES
Discharge: HOME | End: 2024-08-09
Attending: RADIOLOGY
Payer: MEDICAID

## 2024-08-06 PROCEDURE — 99213 OFFICE O/P EST LOW 20 MIN: CPT

## 2024-09-09 ENCOUNTER — OFFICE VISIT (OUTPATIENT)
Age: 59
End: 2024-09-09
Payer: MEDICAID

## 2024-09-09 VITALS
SYSTOLIC BLOOD PRESSURE: 121 MMHG | HEART RATE: 63 BPM | RESPIRATION RATE: 18 BRPM | HEIGHT: 60 IN | DIASTOLIC BLOOD PRESSURE: 69 MMHG | BODY MASS INDEX: 52.42 KG/M2 | WEIGHT: 267 LBS

## 2024-09-09 DIAGNOSIS — G56.02 CARPAL TUNNEL SYNDROME OF LEFT WRIST: ICD-10-CM

## 2024-09-09 DIAGNOSIS — M62.838 MUSCLE SPASM: ICD-10-CM

## 2024-09-09 DIAGNOSIS — M47.816 LUMBAR FACET ARTHROPATHY: Primary | ICD-10-CM

## 2024-09-09 DIAGNOSIS — C50.911 MALIGNANT NEOPLASM OF RIGHT FEMALE BREAST, UNSPECIFIED ESTROGEN RECEPTOR STATUS, UNSPECIFIED SITE OF BREAST (HCC): ICD-10-CM

## 2024-09-09 DIAGNOSIS — M54.12 LEFT CERVICAL RADICULOPATHY: ICD-10-CM

## 2024-09-09 DIAGNOSIS — M54.50 LUMBAR PAIN: ICD-10-CM

## 2024-09-09 PROBLEM — H91.90 HEARING PROBLEM: Status: ACTIVE | Noted: 2024-09-09

## 2024-09-09 PROBLEM — E55.9 VITAMIN D DEFICIENCY: Status: ACTIVE | Noted: 2024-09-09

## 2024-09-09 PROBLEM — N18.9 CHRONIC KIDNEY DISEASE: Status: ACTIVE | Noted: 2024-09-09

## 2024-09-09 PROBLEM — N39.46 MIXED STRESS AND URGE URINARY INCONTINENCE: Status: ACTIVE | Noted: 2024-09-09

## 2024-09-09 PROBLEM — D05.91 CARCINOMA IN SITU OF RIGHT BREAST: Status: ACTIVE | Noted: 2024-04-15

## 2024-09-09 PROBLEM — R32 URINARY INCONTINENCE: Status: ACTIVE | Noted: 2024-09-09

## 2024-09-09 PROBLEM — R41.3 MEMORY IMPAIRMENT: Status: ACTIVE | Noted: 2024-09-09

## 2024-09-09 PROBLEM — R92.8 ABNORMAL MAMMOGRAM: Status: ACTIVE | Noted: 2024-09-09

## 2024-09-09 PROBLEM — F51.04 CHRONIC INSOMNIA: Status: ACTIVE | Noted: 2024-09-09

## 2024-09-09 PROBLEM — A59.01 TRICHOMONAL VULVOVAGINITIS: Status: ACTIVE | Noted: 2019-10-31

## 2024-09-09 PROBLEM — G56.22 CUBITAL TUNNEL SYNDROME ON LEFT: Status: ACTIVE | Noted: 2024-09-09

## 2024-09-09 PROBLEM — F48.9 NONPSYCHOTIC MENTAL DISORDER: Status: ACTIVE | Noted: 2024-09-09

## 2024-09-09 PROBLEM — E66.01 MORBID (SEVERE) OBESITY DUE TO EXCESS CALORIES (HCC): Status: ACTIVE | Noted: 2024-09-09

## 2024-09-09 PROBLEM — Z78.0 MENOPAUSE PRESENT: Status: ACTIVE | Noted: 2019-09-19

## 2024-09-09 PROBLEM — Z99.89 DEPENDENCE ON ENABLING MACHINE: Status: ACTIVE | Noted: 2024-09-09

## 2024-09-09 PROBLEM — E78.5 HYPERLIPIDEMIA: Status: ACTIVE | Noted: 2020-10-01

## 2024-09-09 PROBLEM — R55 SYNCOPE AND COLLAPSE: Status: ACTIVE | Noted: 2020-05-28

## 2024-09-09 PROCEDURE — 99214 OFFICE O/P EST MOD 30 MIN: CPT | Performed by: PHYSICAL MEDICINE & REHABILITATION

## 2024-09-09 PROCEDURE — 3074F SYST BP LT 130 MM HG: CPT | Performed by: PHYSICAL MEDICINE & REHABILITATION

## 2024-09-09 PROCEDURE — 3078F DIAST BP <80 MM HG: CPT | Performed by: PHYSICAL MEDICINE & REHABILITATION

## 2024-09-09 RX ORDER — LIDOCAINE 4 G/G
PATCH TOPICAL
Qty: 30 PATCH | Refills: 1 | Status: SHIPPED | OUTPATIENT
Start: 2024-09-09

## 2024-09-09 RX ORDER — TRAZODONE HYDROCHLORIDE 50 MG/1
50 TABLET, FILM COATED ORAL NIGHTLY PRN
COMMUNITY

## 2024-09-09 RX ORDER — HYDROCHLOROTHIAZIDE 25 MG/1
25 TABLET ORAL EVERY MORNING
COMMUNITY

## 2024-09-09 RX ORDER — ANASTROZOLE 1 MG/1
TABLET ORAL
COMMUNITY

## 2024-10-10 ENCOUNTER — HOSPITAL ENCOUNTER (OUTPATIENT)
Facility: HOSPITAL | Age: 59
Discharge: HOME | End: 2024-10-13
Payer: MEDICAID

## 2024-10-10 VITALS
OXYGEN SATURATION: 96 % | RESPIRATION RATE: 17 BRPM | DIASTOLIC BLOOD PRESSURE: 87 MMHG | TEMPERATURE: 98.6 F | SYSTOLIC BLOOD PRESSURE: 139 MMHG | HEART RATE: 72 BPM

## 2024-10-10 PROBLEM — M47.816 LUMBAR SPONDYLOSIS: Status: ACTIVE | Noted: 2024-10-10

## 2024-10-10 PROCEDURE — 6360000002 HC RX W HCPCS: Performed by: PHYSICAL MEDICINE & REHABILITATION

## 2024-10-10 PROCEDURE — 64493 INJ PARAVERT F JNT L/S 1 LEV: CPT | Performed by: PHYSICAL MEDICINE & REHABILITATION

## 2024-10-10 PROCEDURE — 64493 INJ PARAVERT F JNT L/S 1 LEV: CPT

## 2024-10-10 PROCEDURE — 64494 INJ PARAVERT F JNT L/S 2 LEV: CPT

## 2024-10-10 PROCEDURE — 64494 INJ PARAVERT F JNT L/S 2 LEV: CPT | Performed by: PHYSICAL MEDICINE & REHABILITATION

## 2024-10-10 PROCEDURE — 2500000003 HC RX 250 WO HCPCS: Performed by: PHYSICAL MEDICINE & REHABILITATION

## 2024-10-10 PROCEDURE — 6370000000 HC RX 637 (ALT 250 FOR IP): Performed by: PHYSICAL MEDICINE & REHABILITATION

## 2024-10-10 RX ORDER — IOPAMIDOL 408 MG/ML
4 INJECTION, SOLUTION INTRATHECAL
Status: DISCONTINUED | OUTPATIENT
Start: 2024-10-10 | End: 2024-10-14 | Stop reason: HOSPADM

## 2024-10-10 RX ORDER — DIAZEPAM 5 MG
10 TABLET ORAL ONCE
Status: COMPLETED | OUTPATIENT
Start: 2024-10-10 | End: 2024-10-10

## 2024-10-10 RX ORDER — LIDOCAINE HYDROCHLORIDE 10 MG/ML
30 INJECTION, SOLUTION EPIDURAL; INFILTRATION; INTRACAUDAL; PERINEURAL ONCE
Status: COMPLETED | OUTPATIENT
Start: 2024-10-10 | End: 2024-10-10

## 2024-10-10 RX ORDER — DEXAMETHASONE SODIUM PHOSPHATE 10 MG/ML
10 INJECTION, SOLUTION INTRAMUSCULAR; INTRAVENOUS ONCE
Status: COMPLETED | OUTPATIENT
Start: 2024-10-10 | End: 2024-10-10

## 2024-10-10 RX ORDER — DIAZEPAM 5 MG
2.5 TABLET ORAL ONCE
Status: COMPLETED | OUTPATIENT
Start: 2024-10-10 | End: 2024-10-10

## 2024-10-10 RX ORDER — DIAZEPAM 5 MG
5 TABLET ORAL ONCE
Status: COMPLETED | OUTPATIENT
Start: 2024-10-10 | End: 2024-10-10

## 2024-10-10 RX ADMIN — DIAZEPAM 5 MG: 5 TABLET ORAL at 14:27

## 2024-10-10 RX ADMIN — DEXAMETHASONE SODIUM PHOSPHATE 10 MG: 10 INJECTION, SOLUTION INTRAMUSCULAR; INTRAVENOUS at 14:55

## 2024-10-10 RX ADMIN — LIDOCAINE HYDROCHLORIDE 14 ML: 10 INJECTION, SOLUTION EPIDURAL; INFILTRATION; INTRACAUDAL; PERINEURAL at 14:53

## 2024-10-10 ASSESSMENT — PAIN - FUNCTIONAL ASSESSMENT: PAIN_FUNCTIONAL_ASSESSMENT: 0-10

## 2024-10-10 ASSESSMENT — PAIN DESCRIPTION - DESCRIPTORS: DESCRIPTORS: SHARP;PRESSURE

## 2024-10-10 NOTE — OP NOTE
Facet Joint Block Procedure Note    Patient Name: Cordelia Sandoval    Date of Procedure: October 10, 2024    Preoperative Diagnosis: M47.816    Post Operative Diagnosis:same    Procedure:  bilateral L4-L5 Facet Joint Block  bilateral L5-S1 Facet Joint Block      Consent: Informed consent was obtained prior to the procedure. The patient was given the opportunity to ask questions regarding the procedure and its associated risks. In addition to the potential risks associated with the procedure itself, the patient was informed both verbally and in writing of potential side effects of the use of glucocorticoids. The patient appeared to comprehend the informed consent and desired to have the procedure perfored.    Procedure: The patient was placed in the prone position on the flouroscopy table and the back was prepped and draped in the usual sterile manner. At each blocked level, the exact location of the facet joint was identified with flouroscopy, and after local Lidocaine 1% injection, a #22 gauge spinal needle was then advanced toward the joint. A total of 10 mg of preservative free Dexamethasone and 4 cc of Lidocaine was injected around and equally divided among all of the sites.     The patient tolerated the procedure well. The injection area was cleaned and bandaids applied. No excessive bleeding was noted. Patient dressed and was discharged to home with instructions.       VIDA GALE III, MD  October 10, 2024

## 2025-01-13 ENCOUNTER — OFFICE VISIT (OUTPATIENT)
Age: 60
End: 2025-01-13
Payer: MEDICAID

## 2025-01-13 VITALS
TEMPERATURE: 98.2 F | WEIGHT: 256.8 LBS | HEART RATE: 82 BPM | DIASTOLIC BLOOD PRESSURE: 71 MMHG | BODY MASS INDEX: 50.42 KG/M2 | SYSTOLIC BLOOD PRESSURE: 104 MMHG | HEIGHT: 60 IN | OXYGEN SATURATION: 97 %

## 2025-01-13 DIAGNOSIS — M47.816 LUMBAR FACET ARTHROPATHY: Primary | ICD-10-CM

## 2025-01-13 DIAGNOSIS — M48.062 SPINAL STENOSIS OF LUMBAR REGION WITH NEUROGENIC CLAUDICATION: ICD-10-CM

## 2025-01-13 DIAGNOSIS — C50.911 MALIGNANT NEOPLASM OF RIGHT FEMALE BREAST, UNSPECIFIED ESTROGEN RECEPTOR STATUS, UNSPECIFIED SITE OF BREAST (HCC): ICD-10-CM

## 2025-01-13 DIAGNOSIS — M62.838 MUSCLE SPASM: ICD-10-CM

## 2025-01-13 DIAGNOSIS — M54.50 LUMBAR PAIN: ICD-10-CM

## 2025-01-13 DIAGNOSIS — E66.01 MORBID OBESITY WITH BMI OF 50.0-59.9, ADULT: ICD-10-CM

## 2025-01-13 PROCEDURE — 3074F SYST BP LT 130 MM HG: CPT | Performed by: PHYSICAL MEDICINE & REHABILITATION

## 2025-01-13 PROCEDURE — 3078F DIAST BP <80 MM HG: CPT | Performed by: PHYSICAL MEDICINE & REHABILITATION

## 2025-01-13 PROCEDURE — 99213 OFFICE O/P EST LOW 20 MIN: CPT | Performed by: PHYSICAL MEDICINE & REHABILITATION

## 2025-01-13 RX ORDER — LIDOCAINE 50 MG/G
1 PATCH TOPICAL DAILY
Qty: 30 PATCH | Refills: 1 | Status: SHIPPED | OUTPATIENT
Start: 2025-01-13 | End: 2025-03-14

## 2025-01-13 NOTE — PROGRESS NOTES
Cordelia Sandoval presents today for   Chief Complaint   Patient presents with    Back Pain     Back pain s worse since last visit  Facet Injection 10/10/24 helpful for about two weeks       Is someone accompanying this pt? no    Is the patient using any DME equipment during OV? no          Coordination of Care:  1. Have you been to the ER, urgent care clinic since your last visit? no  Hospitalized since your last visit? no    2. Have you seen or consulted any other health care providers outside of the [facility] since your last visit? no Include any pap smears or colon screening. no    
 Triceps Deltoids Wrist Ext Wrist Flex Hand Intrin   Right +4/5 +4/5 +4/5 +4/5 +4/5 +4/5   Left +4/5 +4/5 +4/5 +4/5 +4/5 +4/5      Hip Flex  Quads Hamstrings Ankle DF EHL Ankle PF   Right +4/5 +4/5 +4/5 +4/5 +4/5 +4/5   Left +4/5 +4/5 +4/5 +4/5 +4/5 +4/5     IMAGING:    ***        Written by *** as dictated by Angela Chacon MD.  I, Dr. Angela Chacon confirm that all documentation is accurate.  
ligamentous hypertrophy     Thecal sac narrowed to 7.6     Consistent with central canal stenosis     Neuroforamina exiting L3 root is not compressed     L4-L5:    Mild signal loss and narrowing     Ligamentous hypertrophy and degenerative changes marked     There is a 3.4 mm subluxation     Thecal sac 7.4 consistent with central canal stenosis     Right and left neuroforamina unremarkable     L5-S1:    Mild facet arthropathy     No central or neuroforaminal stenosis     L5-S1 roots are not compressed     Sacrum and iliac wings:    The visualized sacrum and iliac wings are within  normal limits.     Impression  Central canal stenosis L3-4 and L4-5     facet arthropathy L3-S1        Cervical spine 3V XR's from 04/25/2023 were personally reviewed with the patient and demonstrated:  Straightening of the cervical spine. Degenerative disc C5/6 and C6/7. Mild degenerative facet. Atherosclerosis bilaterally.      Lumbar spine 4V XR's from 04/25/2023 were personally reviewed with the patient and demonstrated:  Multilevel degenerative facet. Degenerative disc L5/S1 and L4/5.      LUE EMG/NCV from 02/14/2023 was personally reviewed with the patient and demonstrated:  NCV & EMG Findings:  Evaluation of the left median-ulnar (dig IV) sensory nerve showed abnormal peak latency difference ((Median Wrist)-(Ulnar Wrist), 0.50 ms).  All remaining nerves (as indicated in the following tables) were within normal limits.     INTERPRETATION  This is an abnormal electrodiagnostic examination. These findings may be consistent with:  Mild median mononeuropathy at the left wrist (carpal tunnel syndrome)      There are no electrodiagnostic findings consistent with lumbar or cervical radiculopathy, brachial or lumbosacral plexopathy, myopathy, polyneuropathy or any other mononeuropathy.        CLINICAL INTERPRETATION  Her electrodiagnostic finding of left median mononeuropathy at the wrist does not appear consistent with her left arm

## 2025-02-07 ENCOUNTER — HOSPITAL ENCOUNTER (OUTPATIENT)
Facility: HOSPITAL | Age: 60
Setting detail: RECURRING SERIES
Discharge: HOME | End: 2025-02-10
Attending: RADIOLOGY
Payer: MEDICAID

## 2025-02-07 PROCEDURE — 99213 OFFICE O/P EST LOW 20 MIN: CPT

## 2025-03-28 ENCOUNTER — OFFICE VISIT (OUTPATIENT)
Age: 60
End: 2025-03-28

## 2025-03-28 DIAGNOSIS — M25.562 ACUTE PAIN OF LEFT KNEE: Primary | ICD-10-CM

## 2025-03-28 NOTE — PROGRESS NOTES
Patient: Cordelia Sandoval                MRN: [id number]       SSN: [id number]  YOB: 1965        AGE: 59 y.o.        SEX: female  There is no height or weight on file to calculate BMI.    PCP: Elver Herrera APRN - NP  03/28/25      This office note has been dictated.      REVIEW OF SYSTEMS:  Constitutional: Negative for fever, chills, weight loss and malaise/fatigue.   HENT: Negative.    Eyes: Negative.    Respiratory: Negative.   Cardiovascular: Negative.   Gastrointestinal: No bowel incontinence or constipation.  Genitourinary: No bladder incontinence or saddle anesthesia.  Skin: Negative.   Neurological: Negative.    Endo/Heme/Allergies: Negative.    Psychiatric/Behavioral: Negative.  Musculoskeletal: As per HPI above.     Past Medical History:   Diagnosis Date    Depression     Hypertension          Current Outpatient Medications:     anastrozole (ARIMIDEX) 1 MG tablet, TAKE 1 TABLET BY MOUTH ONCE DAILY SWALLOW WHOLE WITH A DRINK OF WATER, Disp: , Rfl:     hydroCHLOROthiazide (HYDRODIURIL) 25 MG tablet, Take 1 tablet by mouth every morning, Disp: , Rfl:     doxepin (SINEQUAN) 10 MG capsule, , Disp: , Rfl:     aspirin 81 MG chewable tablet, Take 1 tablet by mouth daily, Disp: , Rfl:     atorvastatin (LIPITOR) 40 MG tablet, , Disp: , Rfl:     ergocalciferol (ERGOCALCIFEROL) 1.25 MG (57479 UT) capsule, Twice a week, Disp: , Rfl:     losartan (COZAAR) 100 MG tablet, TAKE 1 TABLET BY MOUTH ONCE DAILY FOR 90 DAYS, Disp: , Rfl:     tolnaftate (TINACTIN) 1 % cream, ceived the following from [facility]: Outside name: tolnaftate (TINACTIN) 1 % topical cream, Disp: , Rfl:     traZODone (DESYREL) 50 MG tablet, Take 1 tablet by mouth nightly as needed, Disp: , Rfl:     pregabalin (LYRICA) 75 MG capsule, Take 1 in the morning and 2 in the evening for neuropathic symptoms, Disp: 90 capsule, Rfl: 1    losartan-hydroCHLOROthiazide (HYZAAR) 50-12.5 MG per tablet, , Disp: , Rfl:

## 2025-04-07 ENCOUNTER — HOSPITAL ENCOUNTER (OUTPATIENT)
Facility: HOSPITAL | Age: 60
Discharge: HOME | End: 2025-04-10
Payer: MEDICAID

## 2025-04-07 DIAGNOSIS — M25.562 ACUTE PAIN OF LEFT KNEE: ICD-10-CM

## 2025-04-07 PROCEDURE — 73721 MRI JNT OF LWR EXTRE W/O DYE: CPT

## 2025-04-07 NOTE — PROGRESS NOTES
[facility]  [address] [place] [address]., [address]  [place] [address]  Phone: [phone]  Fax: [phone]    Patient's YOB: 1965    ASSESSMENT   Cordelia was seen today for lower back pain and leg pain.    Diagnoses and all orders for this visit:    Lumbar radiculitis  -     pregabalin (LYRICA) 75 MG capsule; Take 1 in the morning and 2 in the evening for neuropathic symptoms  -     DME Order for (Specify) as OP    Acute lateral meniscal tear with peripheral detachment, left, subsequent encounter  -     DME Order for (Specify) as OP    Gait abnormality  -     DME Order for (Specify) as OP    Muscle spasm  -     DME Order for (Specify) as OP    Spinal stenosis of lumbar region with neurogenic claudication  -     DME Order for (Specify) as OP    Lumbar facet arthropathy         IMPRESSION AND PLAN:  Cordelia Sandoval is a 59 y.o. female with history of lumbar facet arthropathy, lumbar spinal stenosis, lumbar radiculitis, and muscle spasms. Pt complains of lower back and LLE pain. Since last visit, pt feels her symptoms have worsened.     Reviewed Left Knee MRI WO Contrast findings from 4/7/25 with patient.   Rx Lyrica 75mg TID for neuropathic symptoms.   Ms. Sandoval has a reminder for a \"due or due soon\" health maintenance. I have asked that she contact her primary care provider, Kellen Lewis PA, for follow-up on this health maintenance.   demonstrated consistency with prescribing.   Patient will follow up with Johny De La Rosa PA-C regarding diagnostic follow-up.   Ordered DME - rollator to assist with ambulation    Return in about 3 months (around 7/14/2025).      HISTORY OF PRESENT ILLNESS:  Cordelia Sandoval is a 59 y.o. RHD female who presents to the office today for a medication follow up. At her last OV (1/13/25), Rx Lidocaine 5% patches.    Patient presents here for medication follow-up. Patient reports intermittently taking Lyrica 75

## 2025-04-14 ENCOUNTER — OFFICE VISIT (OUTPATIENT)
Age: 60
End: 2025-04-14
Payer: MEDICAID

## 2025-04-14 VITALS
SYSTOLIC BLOOD PRESSURE: 112 MMHG | OXYGEN SATURATION: 97 % | BODY MASS INDEX: 49.48 KG/M2 | HEART RATE: 72 BPM | WEIGHT: 252 LBS | TEMPERATURE: 97 F | HEIGHT: 60 IN | DIASTOLIC BLOOD PRESSURE: 63 MMHG

## 2025-04-14 DIAGNOSIS — M62.838 MUSCLE SPASM: ICD-10-CM

## 2025-04-14 DIAGNOSIS — R26.9 GAIT ABNORMALITY: ICD-10-CM

## 2025-04-14 DIAGNOSIS — S83.262D ACUTE LATERAL MENISCAL TEAR WITH PERIPHERAL DETACHMENT, LEFT, SUBSEQUENT ENCOUNTER: ICD-10-CM

## 2025-04-14 DIAGNOSIS — M54.16 LUMBAR RADICULITIS: Primary | ICD-10-CM

## 2025-04-14 DIAGNOSIS — M48.062 SPINAL STENOSIS OF LUMBAR REGION WITH NEUROGENIC CLAUDICATION: ICD-10-CM

## 2025-04-14 DIAGNOSIS — M47.816 LUMBAR FACET ARTHROPATHY: ICD-10-CM

## 2025-04-14 PROCEDURE — 99214 OFFICE O/P EST MOD 30 MIN: CPT | Performed by: PHYSICAL MEDICINE & REHABILITATION

## 2025-04-14 PROCEDURE — 3074F SYST BP LT 130 MM HG: CPT | Performed by: PHYSICAL MEDICINE & REHABILITATION

## 2025-04-14 PROCEDURE — 3078F DIAST BP <80 MM HG: CPT | Performed by: PHYSICAL MEDICINE & REHABILITATION

## 2025-04-14 RX ORDER — OXYBUTYNIN CHLORIDE 5 MG/1
5 TABLET ORAL 2 TIMES DAILY
COMMUNITY
Start: 2025-03-17

## 2025-04-14 RX ORDER — PREGABALIN 75 MG/1
CAPSULE ORAL
Qty: 90 CAPSULE | Refills: 1 | Status: SHIPPED | OUTPATIENT
Start: 2025-04-14 | End: 2025-06-15

## 2025-04-14 RX ORDER — LIDOCAINE 50 MG/G
1 PATCH TOPICAL DAILY
COMMUNITY
Start: 2025-03-22

## 2025-04-14 NOTE — PROGRESS NOTES
Cordelia Sandoval presents today for   Chief Complaint   Patient presents with    Lower Back Pain    Leg Pain     left       Is someone accompanying this pt? no    Is the patient using any DME equipment during OV? no    Coordination of Care:  1. Have you been to the ER, urgent care clinic since your last visit? no  Hospitalized since your last visit? no    2. Have you seen or consulted any other health care providers outside of the [facility] since your last visit? Yes, ortho Include any pap smears or colon screening. no

## 2025-04-25 ENCOUNTER — OFFICE VISIT (OUTPATIENT)
Age: 60
End: 2025-04-25

## 2025-04-25 VITALS — BODY MASS INDEX: 49.22 KG/M2 | HEIGHT: 60 IN

## 2025-04-25 DIAGNOSIS — M25.562 ACUTE PAIN OF LEFT KNEE: Primary | ICD-10-CM

## 2025-04-25 NOTE — PROGRESS NOTES
Patient: Cordelia Sandoval                MRN: [id number]       SSN: [id number]  YOB: 1965        AGE: 59 y.o.        SEX: female  Body mass index is 49.22 kg/m².    PCP: Kellen Lewis PA  04/25/25      This office note has been dictated.      REVIEW OF SYSTEMS:  Constitutional: Negative for fever, chills, weight loss and malaise/fatigue.   HENT: Negative.    Eyes: Negative.    Respiratory: Negative.   Cardiovascular: Negative.   Gastrointestinal: No bowel incontinence or constipation.  Genitourinary: No bladder incontinence or saddle anesthesia.  Skin: Negative.   Neurological: Negative.    Endo/Heme/Allergies: Negative.    Psychiatric/Behavioral: Negative.  Musculoskeletal: As per HPI above.     Past Medical History:   Diagnosis Date    Arthritis     Cancer (HCC) January 2024    Depression     Hypertension          Current Outpatient Medications:     oxyBUTYnin (DITROPAN) 5 MG tablet, Take 1 tablet by mouth 2 times daily, Disp: , Rfl:     lidocaine (LIDODERM) 5 %, Place 1 patch onto the skin daily, Disp: , Rfl:     pregabalin (LYRICA) 75 MG capsule, Take 1 in the morning and 2 in the evening for neuropathic symptoms, Disp: 90 capsule, Rfl: 1    anastrozole (ARIMIDEX) 1 MG tablet, TAKE 1 TABLET BY MOUTH ONCE DAILY SWALLOW WHOLE WITH A DRINK OF WATER, Disp: , Rfl:     hydroCHLOROthiazide (HYDRODIURIL) 25 MG tablet, Take 1 tablet by mouth every morning, Disp: , Rfl:     doxepin (SINEQUAN) 10 MG capsule, Take 1 capsule by mouth nightly, Disp: , Rfl:     aspirin 81 MG chewable tablet, Take 1 tablet by mouth daily, Disp: , Rfl:     atorvastatin (LIPITOR) 40 MG tablet, Take 1 tablet by mouth daily, Disp: , Rfl:     ergocalciferol (ERGOCALCIFEROL) 1.25 MG (46402 UT) capsule, Twice a week, Disp: , Rfl:     losartan (COZAAR) 100 MG tablet, TAKE 1 TABLET BY MOUTH ONCE DAILY FOR 90 DAYS, Disp: , Rfl:     tolnaftate (TINACTIN) 1 % cream, ceived the following from [facility]

## 2025-06-02 NOTE — PROGRESS NOTES
Cordelia Sandoval  1965   Chief Complaint   Patient presents with    Knee Pain     Left          HISTORY OF PRESENT ILLNESS  Cordelia Sandoval is a 59 y.o. female who presents today for evaluation of left knee pain. Pain is a 10/10. Pain has been present for a few days. Patient was entering her son's vehicle leading with her right leg when she felt her left leg drop down.     Patient was referred by Johny De La Rosa PA-C.     Has tried following treatments: Injections:No; Brace:No; Therapy:No; Cane/Crutch:Yes      Allergies   Allergen Reactions    Tramadol Nausea And Vomiting     Other reaction(s): gi distress  N&V        Past Medical History:   Diagnosis Date    Arthritis     Cancer (HCC) 2024    Depression     Hypertension       Social History       Tobacco History       Smoking Status  Former Current Packs/Day  0.3 packs/day Smoking Tobacco Type  Cigarettes   Pack Year History     Packs/Day From To Years    0.25   0.0      Smokeless Tobacco Use  Never              Alcohol History       Alcohol Use Status  Yes Drinks/Week  5 Glasses of wine, 0 Cans of beer, 2 Shots of liquor, 0 Drinks containing 0.5 oz of alcohol per week Amount  7.0 standard drinks of alcohol/wk              Drug Use       Drug Use Status  Yes Types  Marijuana (Weed) Frequency   3 times/week              Sexual Activity       Sexually Active  Not Currently Partners  Male                   Past Surgical History:   Procedure Laterality Date     SECTION      COLONOSCOPY N/A 2022    SCREENING COLONOSCOPY performed by Brandin Viveros MD at [facility] END[facility]      Family History   Problem Relation Age of Onset    Hypertension Other     Cancer Mother     Stroke Neg Hx     Heart Disease Neg Hx     Diabetes Neg Hx      Current Outpatient Medications   Medication Sig    oxyBUTYnin (DITROPAN) 5 MG tablet Take 1 tablet by mouth 2 times daily    lidocaine (LIDODERM) 5 % Place 1 patch onto the skin daily    pregabalin

## 2025-06-03 ENCOUNTER — OFFICE VISIT (OUTPATIENT)
Age: 60
End: 2025-06-03
Payer: MEDICAID

## 2025-06-03 VITALS — HEIGHT: 60 IN | BODY MASS INDEX: 50.34 KG/M2 | WEIGHT: 256.4 LBS

## 2025-06-03 DIAGNOSIS — M17.12 ARTHRITIS OF LEFT KNEE: ICD-10-CM

## 2025-06-03 DIAGNOSIS — M25.562 ACUTE PAIN OF LEFT KNEE: Primary | ICD-10-CM

## 2025-06-03 DIAGNOSIS — S83.242A TEAR OF MEDIAL MENISCUS OF LEFT KNEE, UNSPECIFIED TEAR TYPE, UNSPECIFIED WHETHER OLD OR CURRENT TEAR, INITIAL ENCOUNTER: ICD-10-CM

## 2025-06-03 PROCEDURE — 73560 X-RAY EXAM OF KNEE 1 OR 2: CPT | Performed by: ORTHOPAEDIC SURGERY

## 2025-06-03 PROCEDURE — 99213 OFFICE O/P EST LOW 20 MIN: CPT | Performed by: ORTHOPAEDIC SURGERY

## 2025-06-04 ENCOUNTER — TELEPHONE (OUTPATIENT)
Age: 60
End: 2025-06-04

## 2025-06-04 RX ORDER — MELOXICAM 15 MG/1
7.5 TABLET ORAL DAILY
Qty: 30 TABLET | Refills: 1 | Status: SHIPPED | OUTPATIENT
Start: 2025-06-04

## 2025-06-04 NOTE — TELEPHONE ENCOUNTER
Lab Results   Component Value Date    EGFR 27 03/12/2021    EGFR 26 03/11/2021    EGFR 28 01/04/2021    CREATININE 2 28 (H) 03/12/2021    CREATININE 2 35 (H) 03/11/2021    CREATININE 2 20 (H) 01/04/2021     Blood Pressure: 164/79     Plan:  · Reduced Metoprolol to 12 5 mg b i d  (bradycardic 03/12)  Continue losartan 50 mg q h s  Patient states she was told a rx would be sent to her pharmacy for pain medication, but the pharmacy doesn't have anything on file for her.    Patient uses [facility] pharmacy on [facility], and can be reached at [phone].

## 2025-06-04 NOTE — TELEPHONE ENCOUNTER
Spoke with patient.  Patient states she is not allergic to aspirin.  Verbal order given by Dr. Massey with read back.  Rx signed.

## 2025-06-11 DIAGNOSIS — Z01.818 PREOP EXAMINATION: ICD-10-CM

## 2025-06-11 DIAGNOSIS — Z01.818 PREOP EXAMINATION: Primary | ICD-10-CM

## 2025-07-07 RX ORDER — MELOXICAM 15 MG/1
TABLET ORAL
Qty: 90 TABLET | Refills: 11 | Status: SHIPPED | OUTPATIENT
Start: 2025-07-07

## 2025-07-07 RX ORDER — OXYBUTYNIN CHLORIDE 5 MG/1
TABLET ORAL
Qty: 90 TABLET | Refills: 11 | Status: SHIPPED | OUTPATIENT
Start: 2025-07-07

## 2025-07-08 DIAGNOSIS — M54.16 LUMBAR RADICULITIS: ICD-10-CM

## 2025-07-08 RX ORDER — PREGABALIN 75 MG/1
CAPSULE ORAL
Qty: 90 CAPSULE | Refills: 1 | Status: SHIPPED | OUTPATIENT
Start: 2025-07-08 | End: 2025-09-06

## 2025-07-09 DIAGNOSIS — M54.50 LUMBAR PAIN: ICD-10-CM

## 2025-07-09 DIAGNOSIS — M62.838 MUSCLE SPASM: ICD-10-CM

## 2025-07-09 NOTE — TELEPHONE ENCOUNTER
Christopher from [facility] called to req rx refills on the patient's behalf.    Medications:  Doxepin 10 mg capsule  & Lidocaine patches    Pharmacy:    [facility]-[address] [phone] -  [phone] [[id number]]

## 2025-07-10 NOTE — TELEPHONE ENCOUNTER
I cannot see where this medication has been ordered by you in the past couple of years. Please review.

## 2025-07-11 RX ORDER — LIDOCAINE 50 MG/G
1 PATCH TOPICAL DAILY
Qty: 30 PATCH | Refills: 1 | Status: SHIPPED | OUTPATIENT
Start: 2025-07-11 | End: 2025-09-09

## 2025-07-11 NOTE — TELEPHONE ENCOUNTER
The pt is also requesting a refill on lidocaine patches. This prescription was last ordered in January for #30 patches and one refill. A new prescription has been pended for review. The requested pharmacy is a mail order pharmacy. Not sure if you would like to do a 90 day supply.

## 2025-07-11 NOTE — TELEPHONE ENCOUNTER
Angela Chacon MD to Me      7/11/25  7:04 AM  I don't see any recent notes with this medication and don't recall prescribing this unless it was for a 1 time occurrence.  thanks

## 2025-08-01 RX ORDER — DOXEPIN HYDROCHLORIDE 10 MG/1
CAPSULE ORAL
Qty: 90 CAPSULE | Refills: 11 | OUTPATIENT
Start: 2025-08-01

## 2025-08-01 RX ORDER — LIDOCAINE 50 MG/G
PATCH TOPICAL
Qty: 90 PATCH | Refills: 11 | OUTPATIENT
Start: 2025-08-01

## 2025-08-06 ENCOUNTER — HOSPITAL ENCOUNTER (OUTPATIENT)
Facility: HOSPITAL | Age: 60
Setting detail: RECURRING SERIES
Discharge: HOME | End: 2025-08-09